# Patient Record
Sex: FEMALE | Race: WHITE | HISPANIC OR LATINO | Employment: FULL TIME | URBAN - METROPOLITAN AREA
[De-identification: names, ages, dates, MRNs, and addresses within clinical notes are randomized per-mention and may not be internally consistent; named-entity substitution may affect disease eponyms.]

---

## 2021-01-20 ENCOUNTER — HOSPITAL ENCOUNTER (OUTPATIENT)
Age: 36
Setting detail: OBSERVATION
Discharge: HOME | End: 2021-01-22
Attending: STUDENT IN AN ORGANIZED HEALTH CARE EDUCATION/TRAINING PROGRAM | Admitting: INTERNAL MEDICINE

## 2021-01-20 ENCOUNTER — APPOINTMENT (OUTPATIENT)
Dept: CT IMAGING | Age: 36
End: 2021-01-20
Attending: INTERNAL MEDICINE

## 2021-01-20 ENCOUNTER — APPOINTMENT (OUTPATIENT)
Dept: GENERAL RADIOLOGY | Age: 36
End: 2021-01-20
Attending: STUDENT IN AN ORGANIZED HEALTH CARE EDUCATION/TRAINING PROGRAM

## 2021-01-20 DIAGNOSIS — K30 INDIGESTION: ICD-10-CM

## 2021-01-20 DIAGNOSIS — U07.1 COVID-19 VIRUS INFECTION: Primary | ICD-10-CM

## 2021-01-20 DIAGNOSIS — K37 APPENDICITIS, UNSPECIFIED APPENDICITIS TYPE: ICD-10-CM

## 2021-01-20 LAB
ALBUMIN SERPL-MCNC: 4 G/DL (ref 3.6–5.1)
ALBUMIN/GLOB SERPL: 1.1 {RATIO} (ref 1–2.4)
ALP SERPL-CCNC: 84 UNITS/L (ref 45–117)
ALT SERPL-CCNC: 108 UNITS/L
ANION GAP SERPL CALC-SCNC: 8 MMOL/L (ref 10–20)
APPEARANCE UR: CLEAR
AST SERPL-CCNC: 88 UNITS/L
ATRIAL RATE (BPM): 86
BACTERIA #/AREA URNS HPF: ABNORMAL /HPF
BASOPHILS # BLD: 0 K/MCL (ref 0–0.3)
BASOPHILS NFR BLD: 0 %
BILIRUB SERPL-MCNC: 0.3 MG/DL (ref 0.2–1)
BILIRUB UR QL STRIP: NEGATIVE
BUN SERPL-MCNC: 7 MG/DL (ref 6–20)
BUN/CREAT SERPL: 10 (ref 7–25)
CALCIUM SERPL-MCNC: 8.5 MG/DL (ref 8.4–10.2)
CHLORIDE SERPL-SCNC: 103 MMOL/L (ref 98–107)
CO2 SERPL-SCNC: 27 MMOL/L (ref 21–32)
COLOR UR: YELLOW
CREAT SERPL-MCNC: 0.67 MG/DL (ref 0.51–0.95)
D DIMER PPP FEU-MCNC: <0.19 MG/L (FEU)
DEPRECATED RDW RBC: 45.3 FL (ref 39–50)
EOSINOPHIL # BLD: 0 K/MCL (ref 0–0.5)
EOSINOPHIL NFR BLD: 0 %
ERYTHROCYTE [DISTWIDTH] IN BLOOD: 13.2 % (ref 11–15)
FASTING DURATION TIME PATIENT: ABNORMAL H
FERRITIN SERPL-MCNC: 193 NG/ML (ref 8–252)
GFR SERPLBLD BASED ON 1.73 SQ M-ARVRAT: >90 ML/MIN/1.73M2
GLOBULIN SER-MCNC: 3.7 G/DL (ref 2–4)
GLUCOSE SERPL-MCNC: 91 MG/DL (ref 65–99)
GLUCOSE UR STRIP-MCNC: NEGATIVE MG/DL
HCG UR QL: NEGATIVE
HCT VFR BLD CALC: 38.1 % (ref 36–46.5)
HGB BLD-MCNC: 12.4 G/DL (ref 12–15.5)
HGB UR QL STRIP: NEGATIVE
HYALINE CASTS #/AREA URNS LPF: ABNORMAL /LPF
IMM GRANULOCYTES # BLD AUTO: 0 K/MCL (ref 0–0.2)
IMM GRANULOCYTES # BLD: 1 %
IRON SATN MFR SERPL: 6 % (ref 15–45)
IRON SERPL-MCNC: 14 MCG/DL (ref 50–170)
KETONES UR STRIP-MCNC: 80 MG/DL
LEUKOCYTE ESTERASE UR QL STRIP: NEGATIVE
LIPASE SERPL-CCNC: <50 UNITS/L (ref 73–393)
LYMPHOCYTES # BLD: 0.8 K/MCL (ref 1–4.8)
LYMPHOCYTES NFR BLD: 10 %
MAGNESIUM SERPL-MCNC: 2.4 MG/DL (ref 1.7–2.4)
MCH RBC QN AUTO: 30 PG (ref 26–34)
MCHC RBC AUTO-ENTMCNC: 32.5 G/DL (ref 32–36.5)
MCV RBC AUTO: 92 FL (ref 78–100)
MONOCYTES # BLD: 0.6 K/MCL (ref 0.3–0.9)
MONOCYTES NFR BLD: 8 %
MUCOUS THREADS URNS QL MICRO: PRESENT
NEUTROPHILS # BLD: 5.9 K/MCL (ref 1.8–7.7)
NEUTROPHILS NFR BLD: 81 %
NITRITE UR QL STRIP: NEGATIVE
NRBC BLD MANUAL-RTO: 0 /100 WBC
P AXIS (DEGREES): 57
PH UR STRIP: 6 [PH] (ref 5–7)
PLATELET # BLD AUTO: 268 K/MCL (ref 140–450)
POTASSIUM SERPL-SCNC: 3.4 MMOL/L (ref 3.4–5.1)
PR-INTERVAL (MSEC): 132
PROT SERPL-MCNC: 7.7 G/DL (ref 6.4–8.2)
PROT UR STRIP-MCNC: NEGATIVE MG/DL
QRS-INTERVAL (MSEC): 86
QT-INTERVAL (MSEC): 388
QTC: 464
R AXIS (DEGREES): 39
RBC # BLD: 4.14 MIL/MCL (ref 4–5.2)
RBC #/AREA URNS HPF: ABNORMAL /HPF
REPORT TEXT: NORMAL
SODIUM SERPL-SCNC: 135 MMOL/L (ref 135–145)
SP GR UR STRIP: 1.01 (ref 1–1.03)
SQUAMOUS #/AREA URNS HPF: ABNORMAL /HPF
T AXIS (DEGREES): 2
TIBC SERPL-MCNC: 254 MCG/DL (ref 250–450)
TROPONIN I SERPL HS-MCNC: <0.02 NG/ML
UROBILINOGEN UR STRIP-MCNC: 0.2 MG/DL
VENTRICULAR RATE EKG/MIN (BPM): 86
WBC # BLD: 7.3 K/MCL (ref 4.2–11)
WBC #/AREA URNS HPF: ABNORMAL /HPF

## 2021-01-20 PROCEDURE — 96372 THER/PROPH/DIAG INJ SC/IM: CPT

## 2021-01-20 PROCEDURE — 83550 IRON BINDING TEST: CPT | Performed by: INTERNAL MEDICINE

## 2021-01-20 PROCEDURE — G0378 HOSPITAL OBSERVATION PER HR: HCPCS

## 2021-01-20 PROCEDURE — 96375 TX/PRO/DX INJ NEW DRUG ADDON: CPT

## 2021-01-20 PROCEDURE — 96361 HYDRATE IV INFUSION ADD-ON: CPT

## 2021-01-20 PROCEDURE — 86704 HEP B CORE ANTIBODY TOTAL: CPT | Performed by: INTERNAL MEDICINE

## 2021-01-20 PROCEDURE — 10002807 HB RX 258: Performed by: STUDENT IN AN ORGANIZED HEALTH CARE EDUCATION/TRAINING PROGRAM

## 2021-01-20 PROCEDURE — 84703 CHORIONIC GONADOTROPIN ASSAY: CPT

## 2021-01-20 PROCEDURE — 96374 THER/PROPH/DIAG INJ IV PUSH: CPT

## 2021-01-20 PROCEDURE — 10002803 HB RX 637: Performed by: INTERNAL MEDICINE

## 2021-01-20 PROCEDURE — 99284 EMERGENCY DEPT VISIT MOD MDM: CPT

## 2021-01-20 PROCEDURE — 36415 COLL VENOUS BLD VENIPUNCTURE: CPT | Performed by: INTERNAL MEDICINE

## 2021-01-20 PROCEDURE — 96376 TX/PRO/DX INJ SAME DRUG ADON: CPT

## 2021-01-20 PROCEDURE — 93005 ELECTROCARDIOGRAM TRACING: CPT | Performed by: STUDENT IN AN ORGANIZED HEALTH CARE EDUCATION/TRAINING PROGRAM

## 2021-01-20 PROCEDURE — 99285 EMERGENCY DEPT VISIT HI MDM: CPT | Performed by: REGISTERED NURSE

## 2021-01-20 PROCEDURE — 10002807 HB RX 258: Performed by: INTERNAL MEDICINE

## 2021-01-20 PROCEDURE — 83690 ASSAY OF LIPASE: CPT | Performed by: STUDENT IN AN ORGANIZED HEALTH CARE EDUCATION/TRAINING PROGRAM

## 2021-01-20 PROCEDURE — 85379 FIBRIN DEGRADATION QUANT: CPT | Performed by: INTERNAL MEDICINE

## 2021-01-20 PROCEDURE — 10002019 HB COUNTER RESP ASSESSMENT

## 2021-01-20 PROCEDURE — 74176 CT ABD & PELVIS W/O CONTRAST: CPT

## 2021-01-20 PROCEDURE — 93010 ELECTROCARDIOGRAM REPORT: CPT | Performed by: INTERNAL MEDICINE

## 2021-01-20 PROCEDURE — 10004281 HB COUNTER-STAFF TIME PER 15 MIN

## 2021-01-20 PROCEDURE — 80053 COMPREHEN METABOLIC PANEL: CPT | Performed by: EMERGENCY MEDICINE

## 2021-01-20 PROCEDURE — 10002800 HB RX 250 W HCPCS: Performed by: INTERNAL MEDICINE

## 2021-01-20 PROCEDURE — 85025 COMPLETE CBC W/AUTO DIFF WBC: CPT | Performed by: EMERGENCY MEDICINE

## 2021-01-20 PROCEDURE — 87340 HEPATITIS B SURFACE AG IA: CPT | Performed by: INTERNAL MEDICINE

## 2021-01-20 PROCEDURE — 81001 URINALYSIS AUTO W/SCOPE: CPT | Performed by: INTERNAL MEDICINE

## 2021-01-20 PROCEDURE — 83735 ASSAY OF MAGNESIUM: CPT | Performed by: STUDENT IN AN ORGANIZED HEALTH CARE EDUCATION/TRAINING PROGRAM

## 2021-01-20 PROCEDURE — 84484 ASSAY OF TROPONIN QUANT: CPT | Performed by: STUDENT IN AN ORGANIZED HEALTH CARE EDUCATION/TRAINING PROGRAM

## 2021-01-20 PROCEDURE — 71045 X-RAY EXAM CHEST 1 VIEW: CPT

## 2021-01-20 PROCEDURE — 10004651 HB RX, NO CHARGE ITEM: Performed by: INTERNAL MEDICINE

## 2021-01-20 PROCEDURE — 10002800 HB RX 250 W HCPCS: Performed by: STUDENT IN AN ORGANIZED HEALTH CARE EDUCATION/TRAINING PROGRAM

## 2021-01-20 PROCEDURE — 82728 ASSAY OF FERRITIN: CPT | Performed by: INTERNAL MEDICINE

## 2021-01-20 RX ORDER — ACETAMINOPHEN 325 MG/1
650 TABLET ORAL EVERY 4 HOURS PRN
Status: DISCONTINUED | OUTPATIENT
Start: 2021-01-20 | End: 2021-01-22 | Stop reason: HOSPADM

## 2021-01-20 RX ORDER — ONDANSETRON 2 MG/ML
4 INJECTION INTRAMUSCULAR; INTRAVENOUS 2 TIMES DAILY PRN
Status: DISCONTINUED | OUTPATIENT
Start: 2021-01-20 | End: 2021-01-21

## 2021-01-20 RX ORDER — TRAMADOL HYDROCHLORIDE 50 MG/1
50 TABLET ORAL EVERY 6 HOURS PRN
Status: DISCONTINUED | OUTPATIENT
Start: 2021-01-20 | End: 2021-01-22 | Stop reason: HOSPADM

## 2021-01-20 RX ORDER — 0.9 % SODIUM CHLORIDE 0.9 %
2 VIAL (ML) INJECTION EVERY 12 HOURS SCHEDULED
Status: DISCONTINUED | OUTPATIENT
Start: 2021-01-20 | End: 2021-01-22 | Stop reason: HOSPADM

## 2021-01-20 RX ORDER — ALBUTEROL SULFATE 90 UG/1
2 AEROSOL, METERED RESPIRATORY (INHALATION)
Status: DISCONTINUED | OUTPATIENT
Start: 2021-01-20 | End: 2021-01-22 | Stop reason: HOSPADM

## 2021-01-20 RX ORDER — ENOXAPARIN SODIUM 100 MG/ML
40 INJECTION SUBCUTANEOUS DAILY
Status: DISCONTINUED | OUTPATIENT
Start: 2021-01-20 | End: 2021-01-22 | Stop reason: HOSPADM

## 2021-01-20 RX ORDER — ONDANSETRON 2 MG/ML
4 INJECTION INTRAMUSCULAR; INTRAVENOUS ONCE
Status: COMPLETED | OUTPATIENT
Start: 2021-01-20 | End: 2021-01-20

## 2021-01-20 RX ORDER — MAGNESIUM HYDROXIDE/ALUMINUM HYDROXICE/SIMETHICONE 120; 1200; 1200 MG/30ML; MG/30ML; MG/30ML
30 SUSPENSION ORAL EVERY 4 HOURS PRN
Status: DISCONTINUED | OUTPATIENT
Start: 2021-01-20 | End: 2021-01-22 | Stop reason: HOSPADM

## 2021-01-20 RX ORDER — SODIUM CHLORIDE, SODIUM LACTATE, POTASSIUM CHLORIDE, CALCIUM CHLORIDE 600; 310; 30; 20 MG/100ML; MG/100ML; MG/100ML; MG/100ML
INJECTION, SOLUTION INTRAVENOUS CONTINUOUS
Status: DISCONTINUED | OUTPATIENT
Start: 2021-01-20 | End: 2021-01-21

## 2021-01-20 RX ORDER — IPRATROPIUM BROMIDE AND ALBUTEROL SULFATE 2.5; .5 MG/3ML; MG/3ML
3 SOLUTION RESPIRATORY (INHALATION)
Status: DISCONTINUED | OUTPATIENT
Start: 2021-01-20 | End: 2021-01-20 | Stop reason: CLARIF

## 2021-01-20 RX ORDER — ONDANSETRON 2 MG/ML
4 INJECTION INTRAMUSCULAR; INTRAVENOUS EVERY 6 HOURS PRN
Status: DISCONTINUED | OUTPATIENT
Start: 2021-01-20 | End: 2021-01-20

## 2021-01-20 RX ORDER — POLYETHYLENE GLYCOL 3350 17 G/17G
17 POWDER, FOR SOLUTION ORAL DAILY PRN
Status: DISCONTINUED | OUTPATIENT
Start: 2021-01-20 | End: 2021-01-22 | Stop reason: HOSPADM

## 2021-01-20 RX ORDER — AMOXICILLIN 250 MG
2 CAPSULE ORAL DAILY PRN
Status: DISCONTINUED | OUTPATIENT
Start: 2021-01-20 | End: 2021-01-22 | Stop reason: HOSPADM

## 2021-01-20 RX ORDER — PROCHLORPERAZINE EDISYLATE 5 MG/ML
10 INJECTION INTRAMUSCULAR; INTRAVENOUS EVERY 6 HOURS PRN
Status: DISCONTINUED | OUTPATIENT
Start: 2021-01-20 | End: 2021-01-22 | Stop reason: HOSPADM

## 2021-01-20 RX ORDER — METOCLOPRAMIDE HYDROCHLORIDE 5 MG/ML
5 INJECTION INTRAMUSCULAR; INTRAVENOUS EVERY 6 HOURS PRN
Status: DISCONTINUED | OUTPATIENT
Start: 2021-01-20 | End: 2021-01-22

## 2021-01-20 RX ORDER — POTASSIUM CHLORIDE 1.5 G/1.58G
40 POWDER, FOR SOLUTION ORAL ONCE
Status: DISCONTINUED | OUTPATIENT
Start: 2021-01-20 | End: 2021-01-22 | Stop reason: HOSPADM

## 2021-01-20 RX ORDER — METOCLOPRAMIDE HYDROCHLORIDE 5 MG/ML
10 INJECTION INTRAMUSCULAR; INTRAVENOUS ONCE
Status: COMPLETED | OUTPATIENT
Start: 2021-01-20 | End: 2021-01-20

## 2021-01-20 RX ORDER — ALBUTEROL SULFATE 90 UG/1
2 AEROSOL, METERED RESPIRATORY (INHALATION)
Status: DISCONTINUED | OUTPATIENT
Start: 2021-01-20 | End: 2021-01-20

## 2021-01-20 RX ORDER — BISACODYL 10 MG
10 SUPPOSITORY, RECTAL RECTAL DAILY PRN
Status: DISCONTINUED | OUTPATIENT
Start: 2021-01-20 | End: 2021-01-22 | Stop reason: HOSPADM

## 2021-01-20 RX ORDER — ACETAMINOPHEN 500 MG
1000 TABLET ORAL EVERY 6 HOURS PRN
COMMUNITY

## 2021-01-20 RX ADMIN — METOCLOPRAMIDE HYDROCHLORIDE 10 MG: 5 INJECTION INTRAMUSCULAR; INTRAVENOUS at 14:07

## 2021-01-20 RX ADMIN — ONDANSETRON 4 MG: 2 INJECTION INTRAMUSCULAR; INTRAVENOUS at 12:23

## 2021-01-20 RX ADMIN — ENOXAPARIN SODIUM 40 MG: 40 INJECTION SUBCUTANEOUS at 21:12

## 2021-01-20 RX ADMIN — SODIUM CHLORIDE, PRESERVATIVE FREE 2 ML: 5 INJECTION INTRAVENOUS at 21:12

## 2021-01-20 RX ADMIN — SODIUM CHLORIDE, SODIUM LACTATE, POTASSIUM CHLORIDE, AND CALCIUM CHLORIDE 1000 ML: .6; .31; .03; .02 INJECTION, SOLUTION INTRAVENOUS at 12:23

## 2021-01-20 RX ADMIN — ALBUTEROL SULFATE 2 PUFF: 90 AEROSOL, METERED RESPIRATORY (INHALATION) at 23:00

## 2021-01-20 RX ADMIN — SODIUM CHLORIDE, SODIUM LACTATE, POTASSIUM CHLORIDE, AND CALCIUM CHLORIDE 1000 ML: .6; .31; .03; .02 INJECTION, SOLUTION INTRAVENOUS at 14:08

## 2021-01-20 RX ADMIN — SODIUM CHLORIDE, SODIUM LACTATE, POTASSIUM CHLORIDE, AND CALCIUM CHLORIDE: .6; .31; .03; .02 INJECTION, SOLUTION INTRAVENOUS at 23:04

## 2021-01-20 RX ADMIN — METOCLOPRAMIDE HYDROCHLORIDE 5 MG: 5 INJECTION INTRAMUSCULAR; INTRAVENOUS at 17:41

## 2021-01-20 ASSESSMENT — ENCOUNTER SYMPTOMS
HEADACHES: 0
NAUSEA: 1
BACK PAIN: 0
CHILLS: 0
DIARRHEA: 1
EYE PAIN: 0
CONFUSION: 0
NERVOUS/ANXIOUS: 0
POLYDIPSIA: 0
FATIGUE: 0
VOMITING: 1
VOICE CHANGE: 0
BRUISES/BLEEDS EASILY: 0
COUGH: 0
SORE THROAT: 0
SHORTNESS OF BREATH: 0
ABDOMINAL PAIN: 0
FEVER: 0

## 2021-01-20 ASSESSMENT — COLUMBIA-SUICIDE SEVERITY RATING SCALE - C-SSRS
6. HAVE YOU EVER DONE ANYTHING, STARTED TO DO ANYTHING, OR PREPARED TO DO ANYTHING TO END YOUR LIFE?: NO
1. WITHIN THE PAST MONTH, HAVE YOU WISHED YOU WERE DEAD OR WISHED YOU COULD GO TO SLEEP AND NOT WAKE UP?: NO
IS THE PATIENT ABLE TO COMPLETE C-SSRS: YES
2. HAVE YOU ACTUALLY HAD ANY THOUGHTS OF KILLING YOURSELF?: NO

## 2021-01-20 ASSESSMENT — PATIENT HEALTH QUESTIONNAIRE - PHQ9
SUM OF ALL RESPONSES TO PHQ9 QUESTIONS 1 AND 2: 1
CLINICAL INTERPRETATION OF PHQ9 SCORE: NO FURTHER SCREENING NEEDED
CLINICAL INTERPRETATION OF PHQ2 SCORE: NO FURTHER SCREENING NEEDED
IS PATIENT ABLE TO COMPLETE PHQ2 OR PHQ9: YES
1. LITTLE INTEREST OR PLEASURE IN DOING THINGS: NOT AT ALL
SUM OF ALL RESPONSES TO PHQ9 QUESTIONS 1 AND 2: 1
2. FEELING DOWN, DEPRESSED OR HOPELESS: SEVERAL DAYS

## 2021-01-20 ASSESSMENT — LIFESTYLE VARIABLES
HOW MANY STANDARD DRINKS CONTAINING ALCOHOL DO YOU HAVE ON A TYPICAL DAY: 0,1 OR 2
AUDIT-C TOTAL SCORE: 2
HOW OFTEN DO YOU HAVE 6 OR MORE DRINKS ON ONE OCCASION: LESS THAN MONTHLY
HOW OFTEN DO YOU HAVE A DRINK CONTAINING ALCOHOL: MONTHLY OR LESS
ALCOHOL_USE_STATUS: NO OR LOW RISK WITH VALIDATED TOOL

## 2021-01-20 ASSESSMENT — PAIN SCALES - GENERAL
PAINLEVEL_OUTOF10: 5
PAINLEVEL_OUTOF10: 0
PAINLEVEL_OUTOF10: 2

## 2021-01-20 ASSESSMENT — ACTIVITIES OF DAILY LIVING (ADL)
CHRONIC_PAIN_PRESENT: YES, CHRONIC
ADL_SCORE: 12
ADL_BEFORE_ADMISSION: INDEPENDENT
ADL_SHORT_OF_BREATH: YES

## 2021-01-20 ASSESSMENT — COGNITIVE AND FUNCTIONAL STATUS - GENERAL
ARE YOU BLIND OR DO YOU HAVE SERIOUS DIFFICULTY SEEING, EVEN WHEN WEARING GLASSES: NO
ARE YOU DEAF OR DO YOU HAVE SERIOUS DIFFICULTY  HEARING: NO

## 2021-01-21 LAB
ALBUMIN SERPL-MCNC: 3.3 G/DL (ref 3.6–5.1)
ALBUMIN/GLOB SERPL: 1 {RATIO} (ref 1–2.4)
ALP SERPL-CCNC: 76 UNITS/L (ref 45–117)
ALT SERPL-CCNC: 101 UNITS/L
ANION GAP SERPL CALC-SCNC: 13 MMOL/L (ref 10–20)
ANNOTATION COMMENT IMP: NORMAL
AST SERPL-CCNC: 95 UNITS/L
BASOPHILS # BLD: 0 K/MCL (ref 0–0.3)
BASOPHILS NFR BLD: 0 %
BILIRUB SERPL-MCNC: 0.2 MG/DL (ref 0.2–1)
BUN SERPL-MCNC: 3 MG/DL (ref 6–20)
BUN/CREAT SERPL: 5 (ref 7–25)
CALCIUM SERPL-MCNC: 8.1 MG/DL (ref 8.4–10.2)
CHLORIDE SERPL-SCNC: 107 MMOL/L (ref 98–107)
CO2 SERPL-SCNC: 23 MMOL/L (ref 21–32)
CREAT SERPL-MCNC: 0.55 MG/DL (ref 0.51–0.95)
D DIMER PPP FEU-MCNC: <0.19 MG/L (FEU)
DEPRECATED RDW RBC: 46.9 FL (ref 39–50)
EOSINOPHIL # BLD: 0 K/MCL (ref 0–0.5)
EOSINOPHIL NFR BLD: 0 %
ERYTHROCYTE [DISTWIDTH] IN BLOOD: 13.4 % (ref 11–15)
FASTING DURATION TIME PATIENT: ABNORMAL H
GFR SERPLBLD BASED ON 1.73 SQ M-ARVRAT: >90 ML/MIN/1.73M2
GLOBULIN SER-MCNC: 3.3 G/DL (ref 2–4)
GLUCOSE SERPL-MCNC: 73 MG/DL (ref 65–99)
HBV CORE IGG+IGM SER QL: NEGATIVE
HBV SURFACE AB SER QL: NEGATIVE
HBV SURFACE AG SER QL: NEGATIVE
HCT VFR BLD CALC: 35.4 % (ref 36–46.5)
HCV AB SER QL: NEGATIVE
HGB BLD-MCNC: 11.2 G/DL (ref 12–15.5)
IMM GRANULOCYTES # BLD AUTO: 0.1 K/MCL (ref 0–0.2)
IMM GRANULOCYTES # BLD: 1 %
INR PPP: 1.1
LYMPHOCYTES # BLD: 1.1 K/MCL (ref 1–4.8)
LYMPHOCYTES NFR BLD: 15 %
MAGNESIUM SERPL-MCNC: 2 MG/DL (ref 1.7–2.4)
MCH RBC QN AUTO: 30.1 PG (ref 26–34)
MCHC RBC AUTO-ENTMCNC: 31.6 G/DL (ref 32–36.5)
MCV RBC AUTO: 95.2 FL (ref 78–100)
MONOCYTES # BLD: 0.5 K/MCL (ref 0.3–0.9)
MONOCYTES NFR BLD: 7 %
NEUTROPHILS # BLD: 5.4 K/MCL (ref 1.8–7.7)
NEUTROPHILS NFR BLD: 77 %
NRBC BLD MANUAL-RTO: 0 /100 WBC
NT-PROBNP SERPL-MCNC: 145 PG/ML
PLATELET # BLD AUTO: 253 K/MCL (ref 140–450)
POTASSIUM SERPL-SCNC: 3.4 MMOL/L (ref 3.4–5.1)
PROCALCITONIN SERPL IA-MCNC: 0.15 NG/ML
PROT SERPL-MCNC: 6.6 G/DL (ref 6.4–8.2)
PROTHROMBIN TIME: 11.6 SEC (ref 9.7–11.8)
RBC # BLD: 3.72 MIL/MCL (ref 4–5.2)
SODIUM SERPL-SCNC: 140 MMOL/L (ref 135–145)
WBC # BLD: 7.1 K/MCL (ref 4.2–11)

## 2021-01-21 PROCEDURE — 36415 COLL VENOUS BLD VENIPUNCTURE: CPT | Performed by: INTERNAL MEDICINE

## 2021-01-21 PROCEDURE — 96372 THER/PROPH/DIAG INJ SC/IM: CPT

## 2021-01-21 PROCEDURE — 85610 PROTHROMBIN TIME: CPT | Performed by: INTERNAL MEDICINE

## 2021-01-21 PROCEDURE — 10002800 HB RX 250 W HCPCS: Performed by: INTERNAL MEDICINE

## 2021-01-21 PROCEDURE — 84145 PROCALCITONIN (PCT): CPT | Performed by: INTERNAL MEDICINE

## 2021-01-21 PROCEDURE — 10002800 HB RX 250 W HCPCS: Performed by: HOSPITALIST

## 2021-01-21 PROCEDURE — 10002807 HB RX 258: Performed by: INTERNAL MEDICINE

## 2021-01-21 PROCEDURE — 99220 INITIAL OBSERVATION CARE,LEVL III: CPT | Performed by: SURGERY

## 2021-01-21 PROCEDURE — 85379 FIBRIN DEGRADATION QUANT: CPT | Performed by: INTERNAL MEDICINE

## 2021-01-21 PROCEDURE — 85025 COMPLETE CBC W/AUTO DIFF WBC: CPT | Performed by: INTERNAL MEDICINE

## 2021-01-21 PROCEDURE — 10004651 HB RX, NO CHARGE ITEM: Performed by: INTERNAL MEDICINE

## 2021-01-21 PROCEDURE — G0378 HOSPITAL OBSERVATION PER HR: HCPCS

## 2021-01-21 PROCEDURE — C9399 UNCLASSIFIED DRUGS OR BIOLOG: HCPCS | Performed by: INTERNAL MEDICINE

## 2021-01-21 PROCEDURE — 96376 TX/PRO/DX INJ SAME DRUG ADON: CPT

## 2021-01-21 PROCEDURE — 96361 HYDRATE IV INFUSION ADD-ON: CPT

## 2021-01-21 PROCEDURE — 83880 ASSAY OF NATRIURETIC PEPTIDE: CPT | Performed by: INTERNAL MEDICINE

## 2021-01-21 PROCEDURE — 83735 ASSAY OF MAGNESIUM: CPT | Performed by: INTERNAL MEDICINE

## 2021-01-21 PROCEDURE — 80053 COMPREHEN METABOLIC PANEL: CPT | Performed by: INTERNAL MEDICINE

## 2021-01-21 PROCEDURE — 10002807 HB RX 258: Performed by: HOSPITALIST

## 2021-01-21 PROCEDURE — 96375 TX/PRO/DX INJ NEW DRUG ADDON: CPT

## 2021-01-21 RX ORDER — SODIUM CHLORIDE 9 MG/ML
INJECTION, SOLUTION INTRAVENOUS CONTINUOUS
Status: DISCONTINUED | OUTPATIENT
Start: 2021-01-21 | End: 2021-01-22 | Stop reason: CLARIF

## 2021-01-21 RX ORDER — FAMOTIDINE 10 MG/ML
20 INJECTION, SOLUTION INTRAVENOUS ONCE
Status: COMPLETED | OUTPATIENT
Start: 2021-01-21 | End: 2021-01-22

## 2021-01-21 RX ADMIN — REMDESIVIR 200 MG: 100 INJECTION, POWDER, LYOPHILIZED, FOR SOLUTION INTRAVENOUS at 03:50

## 2021-01-21 RX ADMIN — METOCLOPRAMIDE HYDROCHLORIDE 5 MG: 5 INJECTION INTRAMUSCULAR; INTRAVENOUS at 23:45

## 2021-01-21 RX ADMIN — ALBUTEROL SULFATE 2 PUFF: 90 AEROSOL, METERED RESPIRATORY (INHALATION) at 11:45

## 2021-01-21 RX ADMIN — SODIUM CHLORIDE: 0.9 INJECTION, SOLUTION INTRAVENOUS at 03:48

## 2021-01-21 RX ADMIN — METOCLOPRAMIDE HYDROCHLORIDE 5 MG: 5 INJECTION INTRAMUSCULAR; INTRAVENOUS at 09:06

## 2021-01-21 RX ADMIN — PROCHLORPERAZINE EDISYLATE 10 MG: 5 INJECTION INTRAMUSCULAR; INTRAVENOUS at 21:02

## 2021-01-21 RX ADMIN — SODIUM CHLORIDE: 0.9 INJECTION, SOLUTION INTRAVENOUS at 15:30

## 2021-01-21 RX ADMIN — PIPERACILLIN AND TAZOBACTAM 3.38 G: 3; .375 INJECTION, POWDER, LYOPHILIZED, FOR SOLUTION INTRAVENOUS; PARENTERAL at 04:58

## 2021-01-21 RX ADMIN — METOCLOPRAMIDE HYDROCHLORIDE 5 MG: 5 INJECTION INTRAMUSCULAR; INTRAVENOUS at 17:54

## 2021-01-21 RX ADMIN — SODIUM CHLORIDE, PRESERVATIVE FREE 2 ML: 5 INJECTION INTRAVENOUS at 20:31

## 2021-01-21 RX ADMIN — PROCHLORPERAZINE EDISYLATE 10 MG: 5 INJECTION INTRAMUSCULAR; INTRAVENOUS at 03:45

## 2021-01-21 RX ADMIN — ONDANSETRON 4 MG: 2 INJECTION INTRAMUSCULAR; INTRAVENOUS at 12:20

## 2021-01-21 RX ADMIN — SODIUM CHLORIDE, PRESERVATIVE FREE 2 ML: 5 INJECTION INTRAVENOUS at 09:02

## 2021-01-21 RX ADMIN — KETOROLAC TROMETHAMINE 15 MG: 15 INJECTION, SOLUTION INTRAMUSCULAR; INTRAVENOUS at 00:35

## 2021-01-21 RX ADMIN — KETOROLAC TROMETHAMINE 15 MG: 15 INJECTION, SOLUTION INTRAMUSCULAR; INTRAVENOUS at 18:38

## 2021-01-21 RX ADMIN — ENOXAPARIN SODIUM 40 MG: 40 INJECTION SUBCUTANEOUS at 09:02

## 2021-01-21 RX ADMIN — KETOROLAC TROMETHAMINE 15 MG: 15 INJECTION, SOLUTION INTRAMUSCULAR; INTRAVENOUS at 12:32

## 2021-01-21 RX ADMIN — PROCHLORPERAZINE EDISYLATE 10 MG: 5 INJECTION INTRAMUSCULAR; INTRAVENOUS at 15:18

## 2021-01-21 RX ADMIN — METOCLOPRAMIDE HYDROCHLORIDE 5 MG: 5 INJECTION INTRAMUSCULAR; INTRAVENOUS at 00:33

## 2021-01-21 ASSESSMENT — PAIN SCALES - GENERAL
PAINLEVEL_OUTOF10: 4
PAINLEVEL_OUTOF10: 5
PAINLEVEL_OUTOF10: 7
PAINLEVEL_OUTOF10: 0
PAINLEVEL_OUTOF10: 8
PAINLEVEL_OUTOF10: 7
PAINLEVEL_OUTOF10: 2

## 2021-01-21 ASSESSMENT — COGNITIVE AND FUNCTIONAL STATUS - GENERAL
ARE YOU DEAF OR DO YOU HAVE SERIOUS DIFFICULTY  HEARING: NO
DO YOU HAVE DIFFICULTY DRESSING OR BATHING: NO
BECAUSE OF A PHYSICAL, MENTAL, OR EMOTIONAL CONDITION, DO YOU HAVE DIFFICULTY DOING ERRANDS ALONE: NO
BECAUSE OF A PHYSICAL, MENTAL, OR EMOTIONAL CONDITION, DO YOU HAVE SERIOUS DIFFICULTY CONCENTRATING, REMEMBERING OR MAKING DECISIONS: NO
DO YOU HAVE SERIOUS DIFFICULTY WALKING OR CLIMBING STAIRS: NO
ARE YOU BLIND OR DO YOU HAVE SERIOUS DIFFICULTY SEEING, EVEN WHEN WEARING GLASSES: NO

## 2021-01-21 ASSESSMENT — ACTIVITIES OF DAILY LIVING (ADL)
ADL_SHORT_OF_BREATH: YES
ADL_BEFORE_ADMISSION: INDEPENDENT
CHRONIC_PAIN_PRESENT: NO
ADL_SCORE: 12
RECENT_DECLINE_ADL: NO
DESCRIBE HOW PAIN IMPACTS YOUR LIFE: NONE/CAN MANAGE

## 2021-01-21 ASSESSMENT — LIFESTYLE VARIABLES
HOW OFTEN DO YOU HAVE A DRINK CONTAINING ALCOHOL: NEVER
AUDIT-C TOTAL SCORE: 0
HOW OFTEN DO YOU HAVE 6 OR MORE DRINKS ON ONE OCCASION: NEVER
HOW MANY STANDARD DRINKS CONTAINING ALCOHOL DO YOU HAVE ON A TYPICAL DAY: 0,1 OR 2
ALCOHOL_USE_STATUS: NO OR LOW RISK WITH VALIDATED TOOL

## 2021-01-22 VITALS
SYSTOLIC BLOOD PRESSURE: 98 MMHG | HEIGHT: 63 IN | WEIGHT: 125 LBS | OXYGEN SATURATION: 98 % | TEMPERATURE: 98.2 F | RESPIRATION RATE: 14 BRPM | HEART RATE: 95 BPM | BODY MASS INDEX: 22.15 KG/M2 | DIASTOLIC BLOOD PRESSURE: 72 MMHG

## 2021-01-22 LAB
ALBUMIN SERPL-MCNC: 2.9 G/DL (ref 3.6–5.1)
ALBUMIN/GLOB SERPL: 0.9 {RATIO} (ref 1–2.4)
ALP SERPL-CCNC: 69 UNITS/L (ref 45–117)
ALT SERPL-CCNC: 89 UNITS/L
ANION GAP SERPL CALC-SCNC: 16 MMOL/L (ref 10–20)
AST SERPL-CCNC: 74 UNITS/L
BASOPHILS # BLD: 0 K/MCL (ref 0–0.3)
BASOPHILS NFR BLD: 0 %
BILIRUB SERPL-MCNC: 0.3 MG/DL (ref 0.2–1)
BUN SERPL-MCNC: 4 MG/DL (ref 6–20)
BUN/CREAT SERPL: 9 (ref 7–25)
CALCIUM SERPL-MCNC: 8.1 MG/DL (ref 8.4–10.2)
CHLORIDE SERPL-SCNC: 110 MMOL/L (ref 98–107)
CO2 SERPL-SCNC: 16 MMOL/L (ref 21–32)
CREAT SERPL-MCNC: 0.44 MG/DL (ref 0.51–0.95)
D DIMER PPP FEU-MCNC: 0.21 MG/L (FEU)
DEPRECATED RDW RBC: 47.1 FL (ref 39–50)
EOSINOPHIL # BLD: 0 K/MCL (ref 0–0.5)
EOSINOPHIL NFR BLD: 0 %
ERYTHROCYTE [DISTWIDTH] IN BLOOD: 13.4 % (ref 11–15)
FASTING DURATION TIME PATIENT: ABNORMAL H
GFR SERPLBLD BASED ON 1.73 SQ M-ARVRAT: >90 ML/MIN/1.73M2
GLOBULIN SER-MCNC: 3.4 G/DL (ref 2–4)
GLUCOSE SERPL-MCNC: 80 MG/DL (ref 65–99)
HCT VFR BLD CALC: 32 % (ref 36–46.5)
HGB BLD-MCNC: 10.3 G/DL (ref 12–15.5)
IMM GRANULOCYTES # BLD AUTO: 0.1 K/MCL (ref 0–0.2)
IMM GRANULOCYTES # BLD: 1 %
LYMPHOCYTES # BLD: 1.2 K/MCL (ref 1–4.8)
LYMPHOCYTES NFR BLD: 21 %
MCH RBC QN AUTO: 30.7 PG (ref 26–34)
MCHC RBC AUTO-ENTMCNC: 32.2 G/DL (ref 32–36.5)
MCV RBC AUTO: 95.2 FL (ref 78–100)
MONOCYTES # BLD: 0.5 K/MCL (ref 0.3–0.9)
MONOCYTES NFR BLD: 8 %
NEUTROPHILS # BLD: 4 K/MCL (ref 1.8–7.7)
NEUTROPHILS NFR BLD: 70 %
NRBC BLD MANUAL-RTO: 0 /100 WBC
PLATELET # BLD AUTO: 217 K/MCL (ref 140–450)
POTASSIUM SERPL-SCNC: 3.5 MMOL/L (ref 3.4–5.1)
PROT SERPL-MCNC: 6.3 G/DL (ref 6.4–8.2)
RBC # BLD: 3.36 MIL/MCL (ref 4–5.2)
SODIUM SERPL-SCNC: 138 MMOL/L (ref 135–145)
WBC # BLD: 5.7 K/MCL (ref 4.2–11)

## 2021-01-22 PROCEDURE — 10002800 HB RX 250 W HCPCS: Performed by: INTERNAL MEDICINE

## 2021-01-22 PROCEDURE — 80053 COMPREHEN METABOLIC PANEL: CPT | Performed by: INTERNAL MEDICINE

## 2021-01-22 PROCEDURE — 36415 COLL VENOUS BLD VENIPUNCTURE: CPT | Performed by: INTERNAL MEDICINE

## 2021-01-22 PROCEDURE — 85379 FIBRIN DEGRADATION QUANT: CPT | Performed by: INTERNAL MEDICINE

## 2021-01-22 PROCEDURE — 96375 TX/PRO/DX INJ NEW DRUG ADDON: CPT

## 2021-01-22 PROCEDURE — G0378 HOSPITAL OBSERVATION PER HR: HCPCS

## 2021-01-22 PROCEDURE — 10004651 HB RX, NO CHARGE ITEM: Performed by: INTERNAL MEDICINE

## 2021-01-22 PROCEDURE — 96372 THER/PROPH/DIAG INJ SC/IM: CPT

## 2021-01-22 PROCEDURE — 96376 TX/PRO/DX INJ SAME DRUG ADON: CPT

## 2021-01-22 PROCEDURE — 10002801 HB RX 250 W/O HCPCS: Performed by: INTERNAL MEDICINE

## 2021-01-22 PROCEDURE — 85025 COMPLETE CBC W/AUTO DIFF WBC: CPT | Performed by: INTERNAL MEDICINE

## 2021-01-22 PROCEDURE — 10002800 HB RX 250 W HCPCS: Performed by: HOSPITALIST

## 2021-01-22 RX ORDER — PANTOPRAZOLE SODIUM 40 MG/1
40 TABLET, DELAYED RELEASE ORAL DAILY
Status: SHIPPED | OUTPATIENT
Start: 2021-01-22

## 2021-01-22 RX ORDER — ALBUTEROL SULFATE 90 UG/1
2 AEROSOL, METERED RESPIRATORY (INHALATION)
Qty: 1 INHALER | Refills: 12 | Status: SHIPPED | OUTPATIENT
Start: 2021-01-22

## 2021-01-22 RX ORDER — METOCLOPRAMIDE 5 MG/1
5 TABLET ORAL 4 TIMES DAILY
Qty: 15 TABLET | Refills: 0 | Status: SHIPPED | OUTPATIENT
Start: 2021-01-22

## 2021-01-22 RX ORDER — ONDANSETRON 2 MG/ML
4 INJECTION INTRAMUSCULAR; INTRAVENOUS ONCE
Status: DISCONTINUED | OUTPATIENT
Start: 2021-01-22 | End: 2021-01-22 | Stop reason: HOSPADM

## 2021-01-22 RX ORDER — MAGNESIUM HYDROXIDE/ALUMINUM HYDROXICE/SIMETHICONE 120; 1200; 1200 MG/30ML; MG/30ML; MG/30ML
30 SUSPENSION ORAL EVERY 4 HOURS PRN
Qty: 100 ML | Refills: 0 | Status: SHIPPED | OUTPATIENT
Start: 2021-01-22

## 2021-01-22 RX ORDER — METOCLOPRAMIDE HYDROCHLORIDE 5 MG/ML
10 INJECTION INTRAMUSCULAR; INTRAVENOUS EVERY 6 HOURS PRN
Status: DISCONTINUED | OUTPATIENT
Start: 2021-01-22 | End: 2021-01-22 | Stop reason: HOSPADM

## 2021-01-22 RX ADMIN — ENOXAPARIN SODIUM 40 MG: 40 INJECTION SUBCUTANEOUS at 09:48

## 2021-01-22 RX ADMIN — FAMOTIDINE 20 MG: 10 INJECTION INTRAVENOUS at 00:16

## 2021-01-22 RX ADMIN — METOCLOPRAMIDE HYDROCHLORIDE 10 MG: 5 INJECTION INTRAMUSCULAR; INTRAVENOUS at 11:22

## 2021-01-22 RX ADMIN — PROCHLORPERAZINE EDISYLATE 10 MG: 5 INJECTION INTRAMUSCULAR; INTRAVENOUS at 09:48

## 2021-01-22 RX ADMIN — PROCHLORPERAZINE EDISYLATE 10 MG: 5 INJECTION INTRAMUSCULAR; INTRAVENOUS at 02:45

## 2021-01-22 RX ADMIN — SODIUM CHLORIDE, PRESERVATIVE FREE 2 ML: 5 INJECTION INTRAVENOUS at 09:30

## 2021-01-22 RX ADMIN — KETOROLAC TROMETHAMINE 15 MG: 15 INJECTION, SOLUTION INTRAMUSCULAR; INTRAVENOUS at 06:36

## 2021-01-22 ASSESSMENT — PAIN SCALES - GENERAL
PAINLEVEL_OUTOF10: 0
PAINLEVEL_OUTOF10: 2
PAINLEVEL_OUTOF10: 5

## 2023-03-13 ENCOUNTER — HOSPITAL ENCOUNTER (OUTPATIENT)
Dept: ULTRASOUND IMAGING | Age: 38
Discharge: HOME | End: 2023-03-13
Attending: STUDENT IN AN ORGANIZED HEALTH CARE EDUCATION/TRAINING PROGRAM
Payer: COMMERCIAL

## 2023-03-13 ENCOUNTER — HOSPITAL ENCOUNTER (OUTPATIENT)
Dept: MAMMOGRAPHY | Age: 38
Discharge: HOME | End: 2023-03-13
Attending: STUDENT IN AN ORGANIZED HEALTH CARE EDUCATION/TRAINING PROGRAM
Payer: COMMERCIAL

## 2023-03-13 DIAGNOSIS — N63.20 MASS OF LEFT BREAST, UNSPECIFIED QUADRANT: ICD-10-CM

## 2023-03-13 PROCEDURE — 76641 ULTRASOUND BREAST COMPLETE: CPT | Performed by: STUDENT IN AN ORGANIZED HEALTH CARE EDUCATION/TRAINING PROGRAM

## 2023-03-13 PROCEDURE — 77062 BREAST TOMOSYNTHESIS BI: CPT | Performed by: STUDENT IN AN ORGANIZED HEALTH CARE EDUCATION/TRAINING PROGRAM

## 2023-03-13 PROCEDURE — 77066 DX MAMMO INCL CAD BI: CPT | Performed by: STUDENT IN AN ORGANIZED HEALTH CARE EDUCATION/TRAINING PROGRAM

## 2023-10-19 ENCOUNTER — HOSPITAL ENCOUNTER (OUTPATIENT)
Age: 38
Discharge: HOME | End: 2023-10-19
Attending: EMERGENCY MEDICINE
Payer: COMMERCIAL

## 2023-10-19 VITALS
OXYGEN SATURATION: 100 % | RESPIRATION RATE: 20 BRPM | TEMPERATURE: 99 F | WEIGHT: 111 LBS | SYSTOLIC BLOOD PRESSURE: 103 MMHG | DIASTOLIC BLOOD PRESSURE: 68 MMHG | HEART RATE: 106 BPM

## 2023-10-19 DIAGNOSIS — U07.1 COVID-19: Primary | ICD-10-CM

## 2023-10-19 PROCEDURE — 93005 ELECTROCARDIOGRAM TRACING: CPT

## 2023-10-19 RX ORDER — NIRMATRELVIR AND RITONAVIR 300-100 MG
KIT ORAL
Qty: 30 TABLET | Refills: 0 | Status: SHIPPED | OUTPATIENT
Start: 2023-10-19 | End: 2023-10-24

## 2023-10-19 RX ORDER — TOFACITINIB 5 MG/1
TABLET, FILM COATED ORAL
COMMUNITY
Start: 2017-12-17

## 2023-10-19 RX ORDER — GABAPENTIN 300 MG/1
1 CAPSULE ORAL 3 TIMES DAILY
COMMUNITY
Start: 2023-09-12

## 2023-10-19 RX ORDER — TRAMADOL HYDROCHLORIDE 50 MG/1
1 TABLET ORAL EVERY 6 HOURS PRN
COMMUNITY
Start: 2023-09-12

## 2023-10-19 RX ORDER — BENZONATATE 100 MG/1
100 CAPSULE ORAL 3 TIMES DAILY PRN
Qty: 30 CAPSULE | Refills: 0 | Status: SHIPPED | OUTPATIENT
Start: 2023-10-19 | End: 2023-11-18

## 2023-10-19 NOTE — ED INITIAL ASSESSMENT (HPI)
Patient c/o chest heaviness and body aches. Patient tested positive for covid today. Symptoms started yesterday.

## 2023-10-19 NOTE — DISCHARGE INSTRUCTIONS
Acetaminophen 1000 mg every 6 hours as needed for pain. Go to the emergency department for any new or worsening symptoms.

## 2023-10-20 LAB
ATRIAL RATE: 90 BPM
P AXIS: 63 DEGREES
P-R INTERVAL: 142 MS
Q-T INTERVAL: 330 MS
QRS DURATION: 80 MS
QTC CALCULATION (BEZET): 403 MS
R AXIS: 53 DEGREES
T AXIS: 27 DEGREES
VENTRICULAR RATE: 90 BPM

## 2024-02-05 ENCOUNTER — OFFICE VISIT (OUTPATIENT)
Dept: OBGYN CLINIC | Facility: CLINIC | Age: 39
End: 2024-02-05
Payer: COMMERCIAL

## 2024-02-05 VITALS — HEART RATE: 82 BPM | SYSTOLIC BLOOD PRESSURE: 94 MMHG | WEIGHT: 118 LBS | DIASTOLIC BLOOD PRESSURE: 62 MMHG

## 2024-02-05 DIAGNOSIS — N94.3 PMS (PREMENSTRUAL SYNDROME): Primary | ICD-10-CM

## 2024-02-05 DIAGNOSIS — N94.6 DYSMENORRHEA: ICD-10-CM

## 2024-02-05 RX ORDER — ACETAMINOPHEN 500 MG
1000 TABLET ORAL EVERY 6 HOURS PRN
COMMUNITY

## 2024-02-05 RX ORDER — ACETAMINOPHEN AND CODEINE PHOSPHATE 120; 12 MG/5ML; MG/5ML
0.35 SOLUTION ORAL DAILY
Qty: 84 TABLET | Refills: 0 | Status: SHIPPED | OUTPATIENT
Start: 2024-02-05 | End: 2025-02-04

## 2024-02-05 RX ORDER — ONDANSETRON 4 MG/1
1 TABLET, ORALLY DISINTEGRATING ORAL EVERY 8 HOURS PRN
COMMUNITY
Start: 2023-10-24

## 2024-02-05 NOTE — PROGRESS NOTES
Here for new gynecology visit.  38 year old G 2 P 2.  Patient's last menstrual period was 2024 (exact date)..     Here to discuss menstrual concerns. She has had ulcerative colitis for years. Her symptoms are not well controlled because they seem to flare with her menses. It starts 2 days prior to her menses and lasts into a few days of the cycle.    She also has increased acne and more recently notes increased cramps with her cycles. It seems to be on day 2 of her menses, the right side most predominantly. It can wrap around to her back as well.    Menses Q 28-30 days for 5-6 days.  Withdrawal for contraception.    Last pap smear was in 2018 and it was normal.  She has had a hx of abnormal pap smears.     OB Hx:  2 .    Family gyn hx:  neg.   Family breast hx:  neg.  Last mammogram in 3/2023.  Screening labs with PCP and Rheumatology  Colonoscopy with GI 2023    Past Medical History:   Diagnosis Date    Fibromyalgia     Ulcerative colitis, chronic (HCC)        Past Surgical History:   Procedure Laterality Date    CHOLECYSTECTOMY       Current Outpatient Medications on File Prior to Visit   Medication Sig Dispense Refill    Multiple Vitamins-Minerals (MULTIVIT/MULTIMINERAL ADULT OR) Take by mouth As Directed.      acetaminophen 500 MG Oral Tab Take 2 tablets (1,000 mg total) by mouth every 6 (six) hours as needed.      ondansetron 4 MG Oral Tablet Dispersible Take 1 tablet (4 mg total) by mouth every 8 (eight) hours as needed.      XELJANZ 5 MG Oral Tab TAKE 1 TABLET TWICE DAILY. MAY BE TAKEN WITH OR WITHOUT FOOD. STORE AT ROOM TEMPERATURE      traMADol 50 MG Oral Tab Take 1 tablet (50 mg total) by mouth every 6 (six) hours as needed.      gabapentin 300 MG Oral Cap Take 1 capsule (300 mg total) by mouth 3 (three) times daily.       No current facility-administered medications on file prior to visit.     OB History    Para Term  AB Living   2 2 2     2   SAB IAB Ectopic Multiple Live  Births           2      # Outcome Date GA Lbr Bridger/2nd Weight Sex Delivery Anes PTL Lv   2 Term 10/2008 38w0d   F Vag-Spont EPI N SHANE   1 Term 02/2002 38w0d  6 lb 5 oz (2.863 kg) F Vag-Spont EPI N SHANE       ROS:    General:  No wt loss, wt gain, appetite changes.  Breasts:  No pain, lumps or secretions.  :   No urgency, frequency, BOWEN, bladder problems in past.              BP 94/62   Pulse 82   Wt 118 lb (53.5 kg)   LMP 01/08/2024 (Exact Date)     VULVA:  No lesions or erythema.  VAGINA:  No lesions, scant discharge.  CERVIX:  No lesions or CMT.  UTERUS:  retroflexed and normal size.  ADNEXA:  No pain or masses.    IMP/PLAN:    1. PMS (premenstrual syndrome)  - Norethindrone (ORTHO MICRONOR) 0.35 MG Oral Tab; Take 1 tablet (0.35 mg total) by mouth daily.  Dispense: 84 tablet; Refill: 0    2. Dysmenorrhea  Could consider pelvic floor PT  - Norethindrone (ORTHO MICRONOR) 0.35 MG Oral Tab; Take 1 tablet (0.35 mg total) by mouth daily.  Dispense: 84 tablet; Refill: 0    See in 3 months for annual exam/ prn.

## 2024-03-15 ENCOUNTER — HOSPITAL ENCOUNTER (OUTPATIENT)
Dept: MRI IMAGING | Facility: HOSPITAL | Age: 39
Discharge: HOME | End: 2024-03-15
Attending: STUDENT IN AN ORGANIZED HEALTH CARE EDUCATION/TRAINING PROGRAM
Payer: COMMERCIAL

## 2024-03-15 ENCOUNTER — PATIENT MESSAGE (OUTPATIENT)
Dept: FAMILY MEDICINE CLINIC | Facility: CLINIC | Age: 39
End: 2024-03-15

## 2024-03-15 DIAGNOSIS — R20.0 NUMBNESS: ICD-10-CM

## 2024-03-15 DIAGNOSIS — R51.9 WORSENING HEADACHES: ICD-10-CM

## 2024-03-15 PROCEDURE — 70553 MRI BRAIN STEM W/O & W/DYE: CPT | Performed by: STUDENT IN AN ORGANIZED HEALTH CARE EDUCATION/TRAINING PROGRAM

## 2024-03-15 PROCEDURE — A9575 INJ GADOTERATE MEGLUMI 0.1ML: HCPCS | Performed by: STUDENT IN AN ORGANIZED HEALTH CARE EDUCATION/TRAINING PROGRAM

## 2024-03-15 RX ORDER — DIPHENHYDRAMINE HYDROCHLORIDE 50 MG/ML
10 INJECTION, SOLUTION INTRAMUSCULAR; INTRAVENOUS
Status: COMPLETED | OUTPATIENT
Start: 2024-03-15 | End: 2024-03-15

## 2024-03-15 RX ADMIN — DIPHENHYDRAMINE HYDROCHLORIDE 10 ML: 50 INJECTION, SOLUTION INTRAMUSCULAR; INTRAVENOUS at 14:15:00

## 2024-03-18 NOTE — TELEPHONE ENCOUNTER
From: Georgette Olguin  To: Viktoriya Quinteros  Sent: 3/15/2024 3:40 PM CDT  Subject: MRI Brain    Hello Dr Quinteros,     I was able to get the MRI done today at 1:30pmcst. Wondering if you’ll call to discuss results or should I schedule an appointment? I see you have a 1:50pm appointment available but states it’s a new patient appointment so I was unsure if I should take it.     Please share your thoughts in the best way for us to connect and discuss results and next steps. The numbing and headaches have remain the same, daily and I don’t really take anything for either symptoms as I’m not sure what to take.     Looking forward to your reply.     Thanks,  Georgette Olguin

## 2024-03-18 NOTE — PROGRESS NOTES
Gabo Palomino,     Your brain MRI results overall are reassuring. It did not show any signs of abnormalities. Please complete your bloodwork when you can. It would be best to follow-up either in office or virtually once you have had a chance to complete your labs so that we could discuss your results and next steps for your plan of care. Please let me know if you have any questions in the meantime.     Please take care,  Dr. Quinteros

## 2024-04-18 ENCOUNTER — LAB ENCOUNTER (OUTPATIENT)
Dept: LAB | Age: 39
End: 2024-04-18
Attending: STUDENT IN AN ORGANIZED HEALTH CARE EDUCATION/TRAINING PROGRAM
Payer: COMMERCIAL

## 2024-04-18 DIAGNOSIS — R53.83 FATIGUE, UNSPECIFIED TYPE: ICD-10-CM

## 2024-04-18 DIAGNOSIS — R20.0 NUMBNESS: ICD-10-CM

## 2024-04-18 DIAGNOSIS — R51.9 WORSENING HEADACHES: ICD-10-CM

## 2024-04-18 LAB
ALBUMIN SERPL-MCNC: 4.3 G/DL (ref 3.4–5)
ALBUMIN/GLOB SERPL: 1 {RATIO} (ref 1–2)
ALP LIVER SERPL-CCNC: 76 U/L
ALT SERPL-CCNC: 21 U/L
ANION GAP SERPL CALC-SCNC: 4 MMOL/L (ref 0–18)
AST SERPL-CCNC: 16 U/L (ref 15–37)
BASOPHILS # BLD AUTO: 0.05 X10(3) UL (ref 0–0.2)
BASOPHILS NFR BLD AUTO: 0.9 %
BILIRUB SERPL-MCNC: 0.3 MG/DL (ref 0.1–2)
BUN BLD-MCNC: 7 MG/DL (ref 9–23)
CALCIUM BLD-MCNC: 10.1 MG/DL (ref 8.5–10.1)
CHLORIDE SERPL-SCNC: 105 MMOL/L (ref 98–112)
CO2 SERPL-SCNC: 28 MMOL/L (ref 21–32)
CREAT BLD-MCNC: 0.78 MG/DL
EGFRCR SERPLBLD CKD-EPI 2021: 100 ML/MIN/1.73M2 (ref 60–?)
EOSINOPHIL # BLD AUTO: 0.03 X10(3) UL (ref 0–0.7)
EOSINOPHIL NFR BLD AUTO: 0.5 %
ERYTHROCYTE [DISTWIDTH] IN BLOOD BY AUTOMATED COUNT: 12.1 %
FASTING STATUS PATIENT QL REPORTED: NO
GLOBULIN PLAS-MCNC: 4.1 G/DL (ref 2.8–4.4)
GLUCOSE BLD-MCNC: 93 MG/DL (ref 70–99)
HCT VFR BLD AUTO: 38.7 %
HGB BLD-MCNC: 12.9 G/DL
IMM GRANULOCYTES # BLD AUTO: 0.02 X10(3) UL (ref 0–1)
IMM GRANULOCYTES NFR BLD: 0.3 %
LYMPHOCYTES # BLD AUTO: 1.62 X10(3) UL (ref 1–4)
LYMPHOCYTES NFR BLD AUTO: 27.6 %
MCH RBC QN AUTO: 31.9 PG (ref 26–34)
MCHC RBC AUTO-ENTMCNC: 33.3 G/DL (ref 31–37)
MCV RBC AUTO: 95.8 FL
MONOCYTES # BLD AUTO: 0.5 X10(3) UL (ref 0.1–1)
MONOCYTES NFR BLD AUTO: 8.5 %
NEUTROPHILS # BLD AUTO: 3.65 X10 (3) UL (ref 1.5–7.7)
NEUTROPHILS # BLD AUTO: 3.65 X10(3) UL (ref 1.5–7.7)
NEUTROPHILS NFR BLD AUTO: 62.2 %
OSMOLALITY SERPL CALC.SUM OF ELEC: 282 MOSM/KG (ref 275–295)
PLATELET # BLD AUTO: 334 10(3)UL (ref 150–450)
POTASSIUM SERPL-SCNC: 4.1 MMOL/L (ref 3.5–5.1)
PROT SERPL-MCNC: 8.4 G/DL (ref 6.4–8.2)
RBC # BLD AUTO: 4.04 X10(6)UL
SODIUM SERPL-SCNC: 137 MMOL/L (ref 136–145)
TSI SER-ACNC: 0.53 MIU/ML (ref 0.36–3.74)
VIT D+METAB SERPL-MCNC: 35.9 NG/ML (ref 30–100)
WBC # BLD AUTO: 5.9 X10(3) UL (ref 4–11)

## 2024-04-18 PROCEDURE — 82306 VITAMIN D 25 HYDROXY: CPT

## 2024-04-18 PROCEDURE — 85025 COMPLETE CBC W/AUTO DIFF WBC: CPT

## 2024-04-18 PROCEDURE — 84443 ASSAY THYROID STIM HORMONE: CPT

## 2024-04-18 PROCEDURE — 36415 COLL VENOUS BLD VENIPUNCTURE: CPT

## 2024-04-18 PROCEDURE — 80053 COMPREHEN METABOLIC PANEL: CPT

## 2024-04-26 DIAGNOSIS — N94.3 PMS (PREMENSTRUAL SYNDROME): ICD-10-CM

## 2024-04-26 DIAGNOSIS — N94.6 DYSMENORRHEA: ICD-10-CM

## 2024-04-26 RX ORDER — ACETAMINOPHEN AND CODEINE PHOSPHATE 120; 12 MG/5ML; MG/5ML
0.35 SOLUTION ORAL DAILY
Qty: 84 TABLET | Refills: 0 | Status: SHIPPED | OUTPATIENT
Start: 2024-04-26

## 2024-04-26 NOTE — TELEPHONE ENCOUNTER
Last OV: 2/5/24 PMS Zenia  Last Refill Date: 2/5/24 #84 0 refills  Follow Up:  3 months 5/8/24 Zenia  Next Appt. 5/8/24     Refill done

## 2024-04-29 ENCOUNTER — TELEMEDICINE (OUTPATIENT)
Dept: FAMILY MEDICINE CLINIC | Facility: CLINIC | Age: 39
End: 2024-04-29
Payer: COMMERCIAL

## 2024-04-29 DIAGNOSIS — M79.7 FIBROMYALGIA: Primary | ICD-10-CM

## 2024-04-29 RX ORDER — GABAPENTIN 300 MG/1
600 CAPSULE ORAL 3 TIMES DAILY
Qty: 180 CAPSULE | Refills: 1 | Status: SHIPPED | OUTPATIENT
Start: 2024-04-29

## 2024-04-29 RX ORDER — TRAMADOL HYDROCHLORIDE 50 MG/1
50 TABLET ORAL EVERY 8 HOURS PRN
Qty: 20 TABLET | Refills: 0 | Status: SHIPPED | OUTPATIENT
Start: 2024-04-29 | End: 2024-05-04

## 2024-04-29 NOTE — PROGRESS NOTES
Subjective:      No chief complaint on file.    HISTORY OF PRESENT ILLNESS  HPI  HPI obtained per patient report.  Georgette Olguin is a pleasant 38 year old female presenting virtually with symptoms of a fibromyalgia flare-up.   She reports worsening of her body aches including in her shoulders and arms beginning a few days ago. She has been taking gabapentin 600 mg TID. She requests a refill of tramadol for her fibromyalgia flare.      PAST PATIENT HISTORY  Past Medical History:    Fibromyalgia    Migraines    PTSD (post-traumatic stress disorder)    Ulcerative colitis, chronic (HCC)     Past Surgical History:   Procedure Laterality Date    Cholecystectomy  2006       CURRENT MEDICATIONS  Outpatient Medications Marked as Taking for the 4/29/24 encounter (Telemedicine) with Viktoriya Quinteros MD   Medication Sig Dispense Refill    traMADol 50 MG Oral Tab Take 1 tablet (50 mg total) by mouth every 8 (eight) hours as needed for Pain. 20 tablet 0    gabapentin 300 MG Oral Cap Take 2 capsules (600 mg total) by mouth 3 (three) times daily. 180 capsule 1       HEALTH MAINTENANCE  Immunization History   Administered Date(s) Administered    >=3 YRS TRI  MULTIDOSE VIAL (44112) FLU CLINIC 10/08/2008    Covid-19 Vaccine Moderna 100 mcg/0.5 ml 03/22/2021, 04/19/2021, 05/30/2022    Covid-19 Vaccine Moderna 50 Mcg/0.25 Ml 11/30/2021    Covid-19 Vaccine Moderna Bivalent 50mcg/0.5mL 12+ years 12/05/2022    FLUZONE 6 months and older PFS 0.5 ml (70534) 10/02/2018, 12/03/2019, 10/03/2022    Influenza 10/02/2018    Pneumococcal (Prevnar 13) 10/02/2018    Pneumovax 23 07/09/2019    TDAP 07/09/2019       ALLERGIES AND DRUG REACTIONS  Allergies   Allergen Reactions    Mesalamine HIVES, ITCHING and RASH     Rash all over face and body       Family History   Problem Relation Age of Onset    Depression Mother     Diabetes Mother     Diabetes Maternal Grandfather     Stroke Maternal Grandfather     Diabetes Maternal Grandmother      Social History      Socioeconomic History    Marital status:    Tobacco Use    Smoking status: Never    Smokeless tobacco: Never   Vaping Use    Vaping status: Never Used   Substance and Sexual Activity    Alcohol use: Yes     Alcohol/week: 1.0 standard drink of alcohol     Types: 1 Glasses of wine per week     Comment: 1-3 drinks a month    Drug use: Never    Sexual activity: Yes     Partners: Male   Other Topics Concern    Caffeine Concern Yes    Stress Concern Yes    Exercise Yes    Seat Belt Yes     Social Determinants of Health     Food Insecurity: Low Risk  (10/3/2022)    Received from [facility]    Food Insecurity     Have there been times that your food ran out, and you didn't have money to get more?: No     Are there times that you worry that this might happen?: No   Transportation Needs: Low Risk  (10/3/2022)    Received from [facility]    Transportation Needs     Do you have trouble getting transportation to medical appointments?: No   Housing Stability: Low Risk  (10/3/2022)    Received from [facility]    Housing Stability     Are you concerned about having a safe and reliable place to live?: No       Review of Systems   Musculoskeletal:  Positive for myalgias.   All other systems reviewed and are negative.         Objective:      LMP 02/28/2024 (Exact Date)   There is no height or weight on file to calculate BMI.    Physical Exam  Constitutional:       General: She is not in acute distress.     Appearance: She is not ill-appearing or toxic-appearing.   Pulmonary:      Effort: Pulmonary effort is normal.   Musculoskeletal:      Cervical back: Neck supple.   Neurological:      Mental Status: She is alert and oriented to person, place, and time.   Psychiatric:         Mood and Affect: Mood normal.            Assessment and Plan:      1. Fibromyalgia  (Primary)  -     traMADol HCl; Take 1 tablet (50 mg total) by mouth every 8 (eight) hours as needed for Pain.  Dispense: 20 tablet; Refill: 0  -     Gabapentin; Take 2 capsules (600 mg total) by mouth 3 (three) times daily.  Dispense: 180 capsule; Refill: 1  -     OP REFERRAL TO PAIN MANAGEMENT    Return in about 4 months (around 8/29/2024).    Fibromyalgia  - history of depression and PTSD  - she is following up weekly with her counselor  - she has also started regular massage and chiropractic therapy  - she is planning on starting trigger point injections in the near future. She states that she is considering this with a pain management specialist in private practice or through [facility] [employer]  - fibromyalgia symptoms are inadequately controlled by  her current regimen of gabapentin and tramadol, and we discussed the significant risks of using tramadol long-term  - alternative treatment options including cymbalta, amitriptyline, and lyrica were discussed, but she is concerned about the potential side effects of these new medications and wishes to defer at this time  - refills for her current regimen were provided. Our goal for her fibromyalgia treatment is to improve her symptom control at baseline and reduce the frequency of her fibromyalgia flares    Patient verbalized understanding of assessment and recommendations. All questions and concerns were addressed.    Telehealth appointment with video and audio was scheduled and completed with the patient's informed consent. Telehealth appointment took place in context of CDC social distancing guidelines during the Covid-19 pandemic.    Electronically signed by Viktoriya Quinteros MD

## 2024-04-30 ENCOUNTER — TELEPHONE (OUTPATIENT)
Dept: FAMILY MEDICINE CLINIC | Facility: CLINIC | Age: 39
End: 2024-04-30

## 2024-05-06 ENCOUNTER — TELEPHONE (OUTPATIENT)
Dept: PAIN CLINIC | Facility: CLINIC | Age: 39
End: 2024-05-06

## 2024-05-06 ENCOUNTER — OFFICE VISIT (OUTPATIENT)
Dept: PAIN CLINIC | Facility: CLINIC | Age: 39
End: 2024-05-06
Payer: COMMERCIAL

## 2024-05-06 VITALS — DIASTOLIC BLOOD PRESSURE: 58 MMHG | OXYGEN SATURATION: 99 % | HEART RATE: 78 BPM | SYSTOLIC BLOOD PRESSURE: 100 MMHG

## 2024-05-06 DIAGNOSIS — G89.29 CHRONIC NECK PAIN: Primary | ICD-10-CM

## 2024-05-06 DIAGNOSIS — M54.2 CHRONIC NECK PAIN: Primary | ICD-10-CM

## 2024-05-06 RX ORDER — TRAMADOL HYDROCHLORIDE 50 MG/1
50 TABLET ORAL DAILY PRN
COMMUNITY

## 2024-05-06 NOTE — PROGRESS NOTES
Subjective:   Patient ID: Georgette Olguin is a 38 year old female.    HPI    History/Other:   Review of Systems  Current Outpatient Medications   Medication Sig Dispense Refill   • gabapentin 300 MG Oral Cap Take 2 capsules (600 mg total) by mouth 3 (three) times daily. 180 capsule 1   • NORETHINDRONE 0.35 MG Oral Tab TAKE 1 TABLET BY MOUTH EVERY DAY 84 tablet 0   • ALPRAZolam (XANAX) 0.5 MG Oral Tab Take 1 to 2 tablets by mouth as needed for anxiety associated with brain MRI scan 2 tablet 0   • Multiple Vitamins-Minerals (MULTIVIT/MULTIMINERAL ADULT OR) Take by mouth As Directed.     • acetaminophen 500 MG Oral Tab Take 2 tablets (1,000 mg total) by mouth every 6 (six) hours as needed.     • ondansetron 4 MG Oral Tablet Dispersible Take 1 tablet (4 mg total) by mouth every 8 (eight) hours as needed.     • XELJANZ 5 MG Oral Tab TAKE 1 TABLET TWICE DAILY. MAY BE TAKEN WITH OR WITHOUT FOOD. STORE AT ROOM TEMPERATURE       Allergies:  Allergies   Allergen Reactions   • Mesalamine HIVES, ITCHING and RASH     Rash all over face and body       Objective:   Physical Exam  Constitutional:          Assessment & Plan:   No diagnosis found.    No orders of the defined types were placed in this encounter.      Meds This Visit:  Requested Prescriptions      No prescriptions requested or ordered in this encounter       Imaging & Referrals:  None    Location of Pain: bilateral neck, bilateral elbows and hands, bilateral low back and hips, bilateral knees, bilateral feet    Date Pain Began: Diagnosed with Fibromyalgia 2018          Work Related:   No        Receiving Work Comp/Disability:   No    Numeric Rating Scale:  Pain at Present:  5/10                                                                                                            (No Pain) 0  to  10 (Worst Pain)                 Minimum Pain:   2  Maximum Pain  9    Distribution of Pain:    bilateral    Quality of Pain:   aching, numbness, and tingling    Origin of  Pain:    Disease related Fibromyalgia    Aggravating Factors:    Cold and Other pain is worst in the mornings    Past Treatments for Current Pain Condition:   NSAIDS and Other gabapentin, tramadol, heat    Prior diagnostic testing for your pain:  n/a

## 2024-05-06 NOTE — PROGRESS NOTES
Patient: Georgette Olguin  Medical Record Number: [id number]  Site: [facility]  Referring Physician:  Viktoriya Quinteros  PCP: same    Dear Dr. Quinteros:    Thank you very much for requesting this consultation. I had the opportunity to evaluate and initiate care for your patient today, as per your request.    HISTORY OF CHIEF COMPLAINT:      Georgette Olguin is a 38 year old female, who complains of diffuse pain from fibromyalgia, though specifically is concerned about neck and trapezius pain.    Patient is here today with pain in above described distribution that began atraumatically years ago.  States that she had been diagnosed with UC, and some time thereafter, underwent extensive workup for her pain, and was diagnosed by Rheum with fibro.  She has been to PT with HEP, to partial relief, and has been on neurontin to limited relief.  She is on tramadol, to partial improvement.  She discussed with PCP, and was sent for consideration of TPI.  She is working with psych for PTSD, though has not seen pain psych.      VAS Pain Score:  6/10    Hand Dominance: right  Loss of dexterity: No  Dropping things: No    Aggravating Factors: Relieving Factors:   Worse in winter  Increased activity  Limiting ADLs      Past Treatment Attempted/Patient’s Response:  As above     Past Medical History:    Fibromyalgia    Migraines    PTSD (post-traumatic stress disorder)    Ulcerative colitis, chronic (HCC)      Past Surgical History:   Procedure Laterality Date    Cholecystectomy  2006      Family History   Problem Relation Age of Onset    Depression Mother     Diabetes Mother     Diabetes Maternal Grandfather     Stroke Maternal Grandfather     Diabetes Maternal Grandmother       Social History     Socioeconomic History    Marital status:    Tobacco Use    Smoking status: Never    Smokeless tobacco: Never   Vaping Use    Vaping status: Never Used   Substance and Sexual Activity    Alcohol use: Yes     Alcohol/week: 1.0  standard drink of alcohol     Types: 1 Glasses of wine per week     Comment: 1-3 drinks a month    Drug use: Never    Sexual activity: Yes     Partners: Male   Other Topics Concern    Caffeine Concern Yes    Stress Concern Yes    Exercise Yes    Seat Belt Yes      Current Medications:  Current Outpatient Medications   Medication Sig Dispense Refill    traMADol 50 MG Oral Tab Take 1 tablet (50 mg total) by mouth daily as needed for Pain.      gabapentin 300 MG Oral Cap Take 2 capsules (600 mg total) by mouth 3 (three) times daily. 180 capsule 1    Multiple Vitamins-Minerals (MULTIVIT/MULTIMINERAL ADULT OR) Take by mouth As Directed.      acetaminophen 500 MG Oral Tab Take 2 tablets (1,000 mg total) by mouth every 6 (six) hours as needed.      ondansetron 4 MG Oral Tablet Dispersible Take 1 tablet (4 mg total) by mouth every 8 (eight) hours as needed.      XELJANZ 5 MG Oral Tab TAKE 1 TABLET TWICE DAILY. MAY BE TAKEN WITH OR WITHOUT FOOD. STORE AT ROOM TEMPERATURE          Functional Assessment: Patient reports that they are able to complete all of their ADL's such as eating, bathing, using the toilet, dressing and getting up from a bed or a chair independently.    Work History:  The patient currently works full time as  for [employer].       REVIEW OF SYSTEMS:   10 point review of systems is otherwise negative,unless otherwise in HPI.      Radiology/Lab Test Reviewed:  no relevant imaging    CBC:    Lab Results   Component Value Date    WBC 5.9 04/18/2024   No results found for: \"HEMOGLOBIN\"  Lab Results   Component Value Date    .0 04/18/2024       PHYSICAL EXAMIMATION   PHYSICAL EXAMINATION: Georgette Olguin is a 38 year old female who is observed sitting comfortably on a chair in the exam room alert and oriented times three. She looks consistent with her stated age.    Ht Readings from Last 1 Encounters:   03/13/24 62\"     Wt Readings from Last 1 Encounters:   03/13/24 115 lb 6 oz (52.3 kg)     The patient  is well developed, well nourished, normal body habitus, well muscled. She moves independently from sitting to standing with ease.       Coordination:  Well coordinated; able to engage in rapid alternating movements bilateral upper extremities    Tandem Walk: Able    ROM Cervical Spine:  See chart below:  Motion Right (+ or -) Left (+ or -)   Cervical flexion + +   Cervical extension + +   Cervical lateral bending - -   Cervical rotation + +     Integument:  Skin over area of cervical spine intact; no erythema, rashes, excoriations, lesions noted    Palpation:  See chart below:  Palpation of Right (+ or -) Left (+ or -)   Cervical Facets - -   Thoracic Facets - -   Paraspinal - -   Trapezius + +   Scapula - -   Occipital - -     DTR:  DTR grossly intact throughout bilateral upper extremities, 2/4 throughout    Strength:  Strength of bilateral upper extremities grossly intact; 5/5 throughout    Sensation:  Normal sensation noted to light touch and pressure throughout bilateral upper extremities.    Tests:  Test Right (+ or -) Left (+ or -)   Spurling - -   Valiente’s Test     Clonus       HEAD/NECK: Head is normocephalic, neck supple  EYES: EOMI, LATONYA  LYMPH EXAM: There is no lymph edema in either lower extremity.  VASCULAR EXAM: Radial pulses are normal bilaterally, with good distal perfusion. No clubbing or cyanosis.  HEART: normal, regular, S1 and S2  LUNGS: CTA  MUSCULOSKELETAL: Smooth, pain-free ROM to bilateral shoulders,elbows, wrists and digits.    Do you have any known blood/bleeding disorders?  No  Does patient currently take blood thinners?   None  Does patient currently take any antibiotics?   No    Patient is currently on pain medications:  No  Reason pain medications are prescribed: N/A  Pain medications are prescribed by: N/A  [place] Prescription Monitoring review: N/A  DIRE: N/A  Treatment decision: N/A    MEDICAL DECISION MAKING:   Impression: myofascial pain, chronic neck pain,  fibromyalgia    Patient has 10+ year history of chronic, diffuse pain, today, moreso in B traps, with known history of fibromyalgia.  She is managed on neurontin, to partial relief, and is hesitant to consider lyrica, cymbalta, or savella.  She is not seeing rheum or physiatry, though is seeing psych for PTSD.  She uses tramadol for pain, though mainly for flares.      On exam, pain with ROM C spine, more significant pain to palpation R>L traps, though certainly diffusely tender to palpation.  No focal sensory/motor deficits.      We discussed options, and will have her evaluated by pain psych.  In addition, will have her work with integrative medicine.  Could consider TPI, should conservative management fail.      Plan: acupuncture, pain psych.  XR C spine.  F/u if pain persists.         The patient indicates understanding of these issues and agrees to the plan.      Thank you very much.     Respectfully yours,    KANDICE Jules

## 2024-05-06 NOTE — PATIENT INSTRUCTIONS
Refill policies:    Allow 2-3 business days for refills; controlled substances may take longer.  Contact your pharmacy at least 5 days prior to running out of medication and have them send an electronic request or submit request through the “request refill” option in your [employer] account.  Refills are not addressed on weekends; covering physicians do not authorize routine medications on weekends.  No narcotics or controlled substances are refilled after noon on Fridays or by on call physicians.  By law, narcotics must be electronically prescribed.  A 30 day supply with no refills is the maximum allowed.  If your prescription is due for a refill, you may be due for a follow up appointment.  To best provide you care, patients receiving routine medications need to be seen at least once a year.  Patients receiving narcotic/controlled substance medications need to be seen at least once every 3 months.  In the event that your preferred pharmacy does not have the requested medication in stock (e.g. Backordered), it is your responsibility to find another pharmacy that has the requested medication available.  We will gladly send a new prescription to that pharmacy at your request.    Scheduling Tests:    If your physician has ordered radiology tests such as MRI or CT scans, please contact Central Scheduling at [phone] right away to schedule the test.  Once scheduled, the [facility] Centralized Referral Team will work with your insurance carrier to obtain pre-certification or prior authorization.  Depending on your insurance carrier, approval may take 3-10 days.  It is highly recommended patients assure they have received an authorization before having a test performed.  If test is done without insurance authorization, patient may be responsible for the entire amount billed.      Precertification and Prior Authorizations:  If your physician has recommended that you have a procedure or additional testing performed the [facility]  Centralized Referral Team will contact your insurance carrier to obtain pre-certification or prior authorization.    You are strongly encouraged to contact your insurance carrier to verify that your procedure/test has been approved and is a COVERED benefit.  Although the [facility] Centralized Referral Team does its due diligence, the insurance carrier gives the disclaimer that \"Although the procedure is authorized, this does not guarantee payment.\"    Ultimately the patient is responsible for payment.   Thank you for your understanding in this matter.  Paperwork Completion:  If you require FMLA or disability paperwork for your recovery, please make sure to either drop it off or have it faxed to our office at [phone]. Be sure the form has your name and date of birth on it.  The form will be faxed to our Forms Department and they will complete it for you.  There is a 25$ fee for all forms that need to be filled out.  Please be aware there is a 10-14 day turnaround time.  You will need to sign a release of information (ANI) form if your paperwork does not come with one.  You may call the Forms Department with any questions at [phone].  Their fax number is [phone].

## 2024-05-10 ENCOUNTER — TELEPHONE (OUTPATIENT)
Age: 39
End: 2024-05-10

## 2024-05-10 NOTE — TELEPHONE ENCOUNTER
Hello,    Thank you for speaking with me this morning. I'm from the [facility] Navigation department, following up on an order from your provider's office to assist in connecting you with resources for behavioral health support. I know you are already connected with a provider at this time, but wanted to be sure you had access to our number - please feel free to give us a call back at [phone] if you ever need. Take care.

## 2024-06-24 ENCOUNTER — TELEPHONE (OUTPATIENT)
Dept: FAMILY MEDICINE CLINIC | Facility: CLINIC | Age: 39
End: 2024-06-24

## 2024-06-24 NOTE — TELEPHONE ENCOUNTER
Received a fax from [facility] stating \"insurance wont cover 6 caps/day - please change RX to 600mg tabs 1 tablets TID\"    -please advise if ok to change.

## 2024-06-25 RX ORDER — GABAPENTIN 600 MG/1
600 TABLET ORAL 3 TIMES DAILY
Qty: 270 TABLET | Refills: 0 | Status: SHIPPED | OUTPATIENT
Start: 2024-06-25

## 2024-10-15 ENCOUNTER — LAB ENCOUNTER (OUTPATIENT)
Dept: LAB | Age: 39
End: 2024-10-15
Attending: INTERNAL MEDICINE
Payer: COMMERCIAL

## 2024-10-15 DIAGNOSIS — K51.90 ULCERATIVE COLITIS (HCC): Primary | ICD-10-CM

## 2024-10-15 LAB
ALBUMIN SERPL-MCNC: 4.9 G/DL (ref 3.2–4.8)
ALBUMIN/GLOB SERPL: 1.7 {RATIO} (ref 1–2)
ALP LIVER SERPL-CCNC: 53 U/L
ALT SERPL-CCNC: 19 U/L
ANION GAP SERPL CALC-SCNC: 4 MMOL/L (ref 0–18)
AST SERPL-CCNC: 27 U/L (ref ?–34)
BASOPHILS # BLD AUTO: 0.03 X10(3) UL (ref 0–0.2)
BASOPHILS NFR BLD AUTO: 0.5 %
BILIRUB SERPL-MCNC: 0.4 MG/DL (ref 0.3–1.2)
BUN BLD-MCNC: 14 MG/DL (ref 9–23)
CALCIUM BLD-MCNC: 9.5 MG/DL (ref 8.7–10.4)
CHLORIDE SERPL-SCNC: 106 MMOL/L (ref 98–112)
CO2 SERPL-SCNC: 24 MMOL/L (ref 21–32)
CREAT BLD-MCNC: 0.74 MG/DL
CRP SERPL-MCNC: <0.4 MG/DL (ref ?–0.5)
DEPRECATED HBV CORE AB SER IA-ACNC: 51 NG/ML
EGFRCR SERPLBLD CKD-EPI 2021: 105 ML/MIN/1.73M2 (ref 60–?)
EOSINOPHIL # BLD AUTO: 0.12 X10(3) UL (ref 0–0.7)
EOSINOPHIL NFR BLD AUTO: 2.1 %
ERYTHROCYTE [DISTWIDTH] IN BLOOD BY AUTOMATED COUNT: 12.7 %
FASTING STATUS PATIENT QL REPORTED: YES
FOLATE SERPL-MCNC: 22.2 NG/ML (ref 5.4–?)
GLOBULIN PLAS-MCNC: 2.9 G/DL (ref 2–3.5)
GLUCOSE BLD-MCNC: 82 MG/DL (ref 70–99)
HCT VFR BLD AUTO: 35.6 %
HGB BLD-MCNC: 12.1 G/DL
IMM GRANULOCYTES # BLD AUTO: 0.02 X10(3) UL (ref 0–1)
IMM GRANULOCYTES NFR BLD: 0.4 %
IRON SATN MFR SERPL: 41 %
IRON SERPL-MCNC: 126 UG/DL
LYMPHOCYTES # BLD AUTO: 1.55 X10(3) UL (ref 1–4)
LYMPHOCYTES NFR BLD AUTO: 27.3 %
MCH RBC QN AUTO: 32.4 PG (ref 26–34)
MCHC RBC AUTO-ENTMCNC: 34 G/DL (ref 31–37)
MCV RBC AUTO: 95.2 FL
MONOCYTES # BLD AUTO: 0.39 X10(3) UL (ref 0.1–1)
MONOCYTES NFR BLD AUTO: 6.9 %
NEUTROPHILS # BLD AUTO: 3.56 X10 (3) UL (ref 1.5–7.7)
NEUTROPHILS # BLD AUTO: 3.56 X10(3) UL (ref 1.5–7.7)
NEUTROPHILS NFR BLD AUTO: 62.8 %
OSMOLALITY SERPL CALC.SUM OF ELEC: 278 MOSM/KG (ref 275–295)
PLATELET # BLD AUTO: 305 10(3)UL (ref 150–450)
POTASSIUM SERPL-SCNC: 3.7 MMOL/L (ref 3.5–5.1)
PROT SERPL-MCNC: 7.8 G/DL (ref 5.7–8.2)
RBC # BLD AUTO: 3.74 X10(6)UL
SODIUM SERPL-SCNC: 134 MMOL/L (ref 136–145)
TOTAL IRON BINDING CAPACITY: 307 UG/DL (ref 250–425)
TRANSFERRIN SERPL-MCNC: 243 MG/DL (ref 250–380)
VIT B12 SERPL-MCNC: 862 PG/ML (ref 211–911)
VIT D+METAB SERPL-MCNC: 42.9 NG/ML (ref 30–100)
WBC # BLD AUTO: 5.7 X10(3) UL (ref 4–11)

## 2024-10-15 PROCEDURE — 82306 VITAMIN D 25 HYDROXY: CPT

## 2024-10-15 PROCEDURE — 83540 ASSAY OF IRON: CPT

## 2024-10-15 PROCEDURE — 86140 C-REACTIVE PROTEIN: CPT

## 2024-10-15 PROCEDURE — 82746 ASSAY OF FOLIC ACID SERUM: CPT

## 2024-10-15 PROCEDURE — 36415 COLL VENOUS BLD VENIPUNCTURE: CPT

## 2024-10-15 PROCEDURE — 82728 ASSAY OF FERRITIN: CPT

## 2024-10-15 PROCEDURE — 83550 IRON BINDING TEST: CPT

## 2024-10-15 PROCEDURE — 82607 VITAMIN B-12: CPT

## 2024-10-15 PROCEDURE — 80053 COMPREHEN METABOLIC PANEL: CPT

## 2024-10-15 PROCEDURE — 85025 COMPLETE CBC W/AUTO DIFF WBC: CPT

## 2024-10-16 ENCOUNTER — LAB ENCOUNTER (OUTPATIENT)
Dept: LAB | Age: 39
End: 2024-10-16
Attending: FAMILY MEDICINE
Payer: COMMERCIAL

## 2024-10-16 DIAGNOSIS — K51.919 CHRONIC ULCERATIVE COLITIS, UNSPECIFIED COMPLICATION (HCC): Primary | ICD-10-CM

## 2024-10-16 PROCEDURE — 87209 SMEAR COMPLEX STAIN: CPT

## 2024-10-16 PROCEDURE — 83993 ASSAY FOR CALPROTECTIN FECAL: CPT

## 2024-10-16 PROCEDURE — 87427 SHIGA-LIKE TOXIN AG IA: CPT

## 2024-10-16 PROCEDURE — 87015 SPECIMEN INFECT AGNT CONCNTJ: CPT

## 2024-10-16 PROCEDURE — 87045 FECES CULTURE AEROBIC BACT: CPT

## 2024-10-16 PROCEDURE — 87177 OVA AND PARASITES SMEARS: CPT

## 2024-10-16 PROCEDURE — 87046 STOOL CULTR AEROBIC BACT EA: CPT

## 2024-10-17 ENCOUNTER — HOSPITAL ENCOUNTER (EMERGENCY)
Age: 39
Discharge: HOME | End: 2024-10-17
Attending: EMERGENCY MEDICINE
Payer: COMMERCIAL

## 2024-10-17 ENCOUNTER — TELEPHONE (OUTPATIENT)
Dept: FAMILY MEDICINE CLINIC | Facility: CLINIC | Age: 39
End: 2024-10-17

## 2024-10-17 ENCOUNTER — APPOINTMENT (OUTPATIENT)
Dept: CT IMAGING | Age: 39
End: 2024-10-17
Attending: NURSE PRACTITIONER
Payer: COMMERCIAL

## 2024-10-17 VITALS
TEMPERATURE: 99 F | BODY MASS INDEX: 27.6 KG/M2 | WEIGHT: 150 LBS | SYSTOLIC BLOOD PRESSURE: 97 MMHG | OXYGEN SATURATION: 100 % | DIASTOLIC BLOOD PRESSURE: 67 MMHG | RESPIRATION RATE: 18 BRPM | HEART RATE: 72 BPM | HEIGHT: 62 IN

## 2024-10-17 DIAGNOSIS — K52.9 COLITIS: Primary | ICD-10-CM

## 2024-10-17 LAB
ALBUMIN SERPL-MCNC: 4 G/DL (ref 3.4–5)
ALBUMIN/GLOB SERPL: 1.1 {RATIO} (ref 1–2)
ALP LIVER SERPL-CCNC: 55 U/L
ALT SERPL-CCNC: 27 U/L
ANION GAP SERPL CALC-SCNC: 7 MMOL/L (ref 0–18)
AST SERPL-CCNC: 19 U/L (ref 15–37)
B-HCG UR QL: NEGATIVE
BASOPHILS # BLD AUTO: 0.04 X10(3) UL (ref 0–0.2)
BASOPHILS NFR BLD AUTO: 0.4 %
BILIRUB SERPL-MCNC: 0.4 MG/DL (ref 0.1–2)
BILIRUB UR QL STRIP.AUTO: NEGATIVE
BUN BLD-MCNC: 19 MG/DL (ref 9–23)
CALCIUM BLD-MCNC: 9.3 MG/DL (ref 8.5–10.1)
CALPROTECTIN STL-MCNT: 293 ΜG/G (ref ?–50)
CHLORIDE SERPL-SCNC: 105 MMOL/L (ref 98–112)
CLARITY UR REFRACT.AUTO: CLEAR
CO2 SERPL-SCNC: 24 MMOL/L (ref 21–32)
COLOR UR AUTO: YELLOW
CREAT BLD-MCNC: 0.7 MG/DL
CRP SERPL-MCNC: <0.29 MG/DL (ref ?–0.3)
EGFRCR SERPLBLD CKD-EPI 2021: 113 ML/MIN/1.73M2 (ref 60–?)
EOSINOPHIL # BLD AUTO: 0.09 X10(3) UL (ref 0–0.7)
EOSINOPHIL NFR BLD AUTO: 1 %
ERYTHROCYTE [DISTWIDTH] IN BLOOD BY AUTOMATED COUNT: 12.5 %
ERYTHROCYTE [SEDIMENTATION RATE] IN BLOOD: 26 MM/HR
GLOBULIN PLAS-MCNC: 3.7 G/DL (ref 2.8–4.4)
GLUCOSE BLD-MCNC: 95 MG/DL (ref 70–99)
GLUCOSE UR STRIP.AUTO-MCNC: NEGATIVE MG/DL
HCT VFR BLD AUTO: 35.3 %
HGB BLD-MCNC: 12.1 G/DL
IMM GRANULOCYTES # BLD AUTO: 0.04 X10(3) UL (ref 0–1)
IMM GRANULOCYTES NFR BLD: 0.4 %
KETONES UR STRIP.AUTO-MCNC: 40 MG/DL
LEUKOCYTE ESTERASE UR QL STRIP.AUTO: NEGATIVE
LIPASE SERPL-CCNC: 33 U/L (ref 12–53)
LYMPHOCYTES # BLD AUTO: 1.48 X10(3) UL (ref 1–4)
LYMPHOCYTES NFR BLD AUTO: 16.3 %
MCH RBC QN AUTO: 32.4 PG (ref 26–34)
MCHC RBC AUTO-ENTMCNC: 34.3 G/DL (ref 31–37)
MCV RBC AUTO: 94.4 FL
MONOCYTES # BLD AUTO: 0.77 X10(3) UL (ref 0.1–1)
MONOCYTES NFR BLD AUTO: 8.5 %
NEUTROPHILS # BLD AUTO: 6.66 X10 (3) UL (ref 1.5–7.7)
NEUTROPHILS # BLD AUTO: 6.66 X10(3) UL (ref 1.5–7.7)
NEUTROPHILS NFR BLD AUTO: 73.4 %
NITRITE UR QL STRIP.AUTO: NEGATIVE
OSMOLALITY SERPL CALC.SUM OF ELEC: 284 MOSM/KG (ref 275–295)
PH UR STRIP.AUTO: 5 [PH] (ref 5–8)
PLATELET # BLD AUTO: 345 10(3)UL (ref 150–450)
POTASSIUM SERPL-SCNC: 3.8 MMOL/L (ref 3.5–5.1)
PROT SERPL-MCNC: 7.7 G/DL (ref 6.4–8.2)
PROT UR STRIP.AUTO-MCNC: NEGATIVE MG/DL
RBC # BLD AUTO: 3.74 X10(6)UL
SODIUM SERPL-SCNC: 136 MMOL/L (ref 136–145)
SP GR UR STRIP.AUTO: >=1.03 (ref 1–1.03)
UROBILINOGEN UR STRIP.AUTO-MCNC: 0.2 MG/DL
WBC # BLD AUTO: 9.1 X10(3) UL (ref 4–11)

## 2024-10-17 PROCEDURE — 96375 TX/PRO/DX INJ NEW DRUG ADDON: CPT

## 2024-10-17 PROCEDURE — 86140 C-REACTIVE PROTEIN: CPT | Performed by: NURSE PRACTITIONER

## 2024-10-17 PROCEDURE — 99285 EMERGENCY DEPT VISIT HI MDM: CPT

## 2024-10-17 PROCEDURE — 80053 COMPREHEN METABOLIC PANEL: CPT | Performed by: NURSE PRACTITIONER

## 2024-10-17 PROCEDURE — 85652 RBC SED RATE AUTOMATED: CPT | Performed by: NURSE PRACTITIONER

## 2024-10-17 PROCEDURE — 81025 URINE PREGNANCY TEST: CPT

## 2024-10-17 PROCEDURE — 99284 EMERGENCY DEPT VISIT MOD MDM: CPT

## 2024-10-17 PROCEDURE — 85025 COMPLETE CBC W/AUTO DIFF WBC: CPT | Performed by: NURSE PRACTITIONER

## 2024-10-17 PROCEDURE — 96374 THER/PROPH/DIAG INJ IV PUSH: CPT

## 2024-10-17 PROCEDURE — 81015 MICROSCOPIC EXAM OF URINE: CPT | Performed by: NURSE PRACTITIONER

## 2024-10-17 PROCEDURE — 74177 CT ABD & PELVIS W/CONTRAST: CPT | Performed by: NURSE PRACTITIONER

## 2024-10-17 PROCEDURE — 81001 URINALYSIS AUTO W/SCOPE: CPT | Performed by: NURSE PRACTITIONER

## 2024-10-17 PROCEDURE — 83690 ASSAY OF LIPASE: CPT | Performed by: NURSE PRACTITIONER

## 2024-10-17 PROCEDURE — 96361 HYDRATE IV INFUSION ADD-ON: CPT

## 2024-10-17 RX ORDER — HYDROMORPHONE HYDROCHLORIDE 1 MG/ML
1 INJECTION, SOLUTION INTRAMUSCULAR; INTRAVENOUS; SUBCUTANEOUS ONCE
Status: COMPLETED | OUTPATIENT
Start: 2024-10-17 | End: 2024-10-17

## 2024-10-17 RX ORDER — METOCLOPRAMIDE HYDROCHLORIDE 5 MG/ML
5 INJECTION INTRAMUSCULAR; INTRAVENOUS ONCE
Status: COMPLETED | OUTPATIENT
Start: 2024-10-17 | End: 2024-10-17

## 2024-10-17 RX ORDER — ONDANSETRON 2 MG/ML
4 INJECTION INTRAMUSCULAR; INTRAVENOUS ONCE
Status: COMPLETED | OUTPATIENT
Start: 2024-10-17 | End: 2024-10-17

## 2024-10-17 RX ORDER — HYDROMORPHONE HYDROCHLORIDE 1 MG/ML
0.5 INJECTION, SOLUTION INTRAMUSCULAR; INTRAVENOUS; SUBCUTANEOUS ONCE
Status: DISCONTINUED | OUTPATIENT
Start: 2024-10-17 | End: 2024-10-17

## 2024-10-17 NOTE — TELEPHONE ENCOUNTER
Called patient. Informed her that she will need an order from the GI specialist for the IV infusion. Patient states that she has a telemed appointment today with specialist and will request order. Infusion clinic phone number and fax number given to patient. Patient verbalized understanding.    Infusion Center  Telephone: [phone]  Fax: [phone]

## 2024-10-17 NOTE — TELEPHONE ENCOUNTER
Patient calling, labs ordered by GI-Dr Chaney. GI recommends for patient to go get IV fluid due to abnormal lab results. Patient advised to call PCP for orders.

## 2024-10-18 ENCOUNTER — LAB ENCOUNTER (OUTPATIENT)
Dept: LAB | Age: 39
End: 2024-10-18
Attending: STUDENT IN AN ORGANIZED HEALTH CARE EDUCATION/TRAINING PROGRAM
Payer: COMMERCIAL

## 2024-10-18 DIAGNOSIS — K51.919 CHRONIC ULCERATIVE COLITIS, UNSPECIFIED COMPLICATION (HCC): ICD-10-CM

## 2024-10-18 PROBLEM — K52.9 COLITIS: Status: ACTIVE | Noted: 2024-10-18

## 2024-10-18 PROCEDURE — 87493 C DIFF AMPLIFIED PROBE: CPT

## 2024-10-18 NOTE — ED INITIAL ASSESSMENT (HPI)
Patient arrives from home with c/o abdominal pina, diarrhea with blood and mucous, hx. Of ulcerative colitis. PCP called her and directed to ER after outpatient labs for further evaluation

## 2024-10-18 NOTE — DISCHARGE INSTRUCTIONS
Take the medications which your gastroenterologist prescribed, which you tell me is prednisone and Zofran.  Call your gastroenterologist tomorrow to arrange a follow-up appointment.  Return to the ER for new or worsening symptoms.

## 2024-10-18 NOTE — ED PROVIDER NOTES
Patient Seen in: [facility] In [place]      History     Chief Complaint   Patient presents with    Abdomen/Flank Pain     Stated Complaint: ulcerative colitis flair    Subjective:   39-year-old female with history of ulcerative colitis, here for possible ulcerative colitis exacerbation/flareup.  States she has had 2 weeks of loose and mucousy bowel movement with blood.  States she did contact her primary care doctor, who did labs for her a few days ago, and was told she needed to come in as she may be dehydrated.  Has had some nausea.  Most recent travel was in August in [place].  No known fevers.  History of cholecystectomy.              Objective:     Past Medical History:    Fibromyalgia    Migraines    PTSD (post-traumatic stress disorder)    Ulcerative colitis, chronic (HCC)              Past Surgical History:   Procedure Laterality Date    Cholecystectomy  2006                Social History     Socioeconomic History    Marital status:    Tobacco Use    Smoking status: Never    Smokeless tobacco: Never   Vaping Use    Vaping status: Never Used   Substance and Sexual Activity    Alcohol use: Yes     Alcohol/week: 1.0 standard drink of alcohol     Types: 1 Glasses of wine per week     Comment: 1-3 drinks a month    Drug use: Never    Sexual activity: Yes     Partners: Male   Other Topics Concern    Caffeine Concern Yes    Stress Concern Yes    Exercise Yes    Seat Belt Yes     Social Drivers of Health     Food Insecurity: Low Risk  (10/3/2022)    Received from [facility]    Food Insecurity     Have there been times that your food ran out, and you didn't have money to get more?: No     Are there times that you worry that this might happen?: No   Transportation Needs: Low Risk  (10/3/2022)    Received from [facility]    Transportation Needs     Do you have trouble getting  transportation to medical appointments?: No   Housing Stability: Low Risk  (10/3/2022)    [facility]    Housing Stability     Are you concerned about having a safe and reliable place to live?: No                  Physical Exam     ED Triage Vitals [10/17/24 1913]   /77   Pulse 70   Resp 17   Temp 98.5 °F (36.9 °C)   Temp src Oral   SpO2 98 %   O2 Device None (Room air)       Current Vitals:   Vital Signs  BP: 97/67  Pulse: 72  Resp: 18  Temp: 98.5 °F (36.9 °C)  Temp src: Oral    Oxygen Therapy  SpO2: 100 %  O2 Device: None (Room air)        Physical Exam  Vitals and nursing note reviewed.   Constitutional:       Appearance: She is well-developed. She is not ill-appearing, toxic-appearing or diaphoretic.   Eyes:      General: No scleral icterus.  Cardiovascular:      Rate and Rhythm: Normal rate and regular rhythm.      Heart sounds: Normal heart sounds.   Pulmonary:      Effort: Pulmonary effort is normal. No respiratory distress.   Abdominal:      General: Abdomen is flat.      Palpations: Abdomen is soft.      Tenderness: There is abdominal tenderness in the right lower quadrant, suprapubic area and left lower quadrant. There is guarding.   Skin:     General: Skin is dry.      Coloration: Skin is not jaundiced or pale.   Neurological:      General: No focal deficit present.      Mental Status: She is alert and oriented to person, place, and time.   Psychiatric:         Mood and Affect: Mood normal.             ED Course     Labs Reviewed   CBC WITH DIFFERENTIAL WITH PLATELET - Abnormal; Notable for the following components:       Result Value    RBC 3.74 (*)     All other components within normal limits   URINALYSIS WITH CULTURE REFLEX - Abnormal; Notable for the following components:    Ketones Urine 40 (*)     Blood Urine Small (*)     All other components within normal limits   UA MICROSCOPIC ONLY, URINE - Abnormal; Notable for the  following components:    Squamous Epi. Cells Few (*)     All other components within normal limits   SED RATE, WESTERGREN (AUTOMATED) - Abnormal; Notable for the following components:    Sed Rate 26 (*)     All other components within normal limits   COMP METABOLIC PANEL (14) - Normal   LIPASE - Normal   C-REACTIVE PROTEIN - Normal   POCT PREGNANCY URINE - Normal     CT ABDOMEN+PELVIS(CONTRAST ONLY)(CPT=74177)    Result Date: 10/17/2024  PROCEDURE:  CT ABDOMEN+PELVIS (CONTRAST ONLY) (CPT=74177)  COMPARISON:  None.  INDICATIONS:  ulcerative colitis flair  TECHNIQUE:  CT scanning was performed from the dome of the diaphragm to the pubic symphysis with non-ionic intravenous contrast material. Post contrast coronal MPR imaging was performed.  Dose reduction techniques were used. Dose information is transmitted to the ACR (American College of Radiology) NRDR (National Radiology Data Registry) which includes the Dose Index Registry.  PATIENT STATED HISTORY:(As transcribed by Technologist)  Lower left and right abd yimi for 3 weeks increasing when she uses the bathroom   CONTRAST USED:  80cc of Isovue 370  FINDINGS:  LIVER:  No enlargement, atrophy, abnormal density, or significant focal lesion.  BILIARY:  Sequelae of cholecystectomy. PANCREAS:  No lesion, fluid collection, ductal dilatation, or atrophy.  SPLEEN:  No enlargement or focal lesion.  KIDNEYS:  No mass, obstruction, or calcification.  ADRENALS:  No mass or enlargement.  AORTA/VASCULAR:  No aneurysm or dissection.  RETROPERITONEUM:  No mass or adenopathy.  BOWEL/MESENTERY:  Diffuse fat infiltration of much of the colon may be sequelae of chronic inflammatory process.  Minimal overall thickening of the descending and sigmoid colon is noted.  Diverticulosis of the colon without evidence of acute diverticulitis.  The appendix is unremarkable. ABDOMINAL WALL:  No mass or hernia.  URINARY BLADDER:  No visible focal wall thickening, lesion, or calculus.  PELVIC NODES:   No adenopathy.  PELVIC ORGANS:  No visible mass.  Pelvic organs appropriate for patient age.  BONES:  No bony lesion or fracture.  LUNG BASES:  No visible pulmonary or pleural disease.  OTHER:  Negative.             CONCLUSION:   Fat infiltration of the colon is likely related to patient's history of chronic ulcerative colitis.  Mild thickening of the descending and sigmoid colon.  May be sequelae of a mild acute colitis.   LOCATION:  [facility]   Dictated by (CST): Arias Parker MD on 10/17/2024 at 10:07 PM     Finalized by (CST): Arias Parker MD on 10/17/2024 at 10:10 PM                    MDM              Medical Decision Making  Differential diagnosis initially included but was not limited to: UTI, appendicitis, diverticulitis, bowel obstruction, UC flareup, gastroenteritis    39-year-old female with 2 weeks of loose, mucus, bloody bowel movements.  No systemic symptoms.  Nausea.  There is tenderness to the lower abdomen both sides including the suprapubic region.  Labs are reassuring.  CT shows no evidence of a surgical abdomen, consistent with colitis.  Given that the WBC is normal, antibiotic is not likely needed.  Patient states she feels better.  Her gastroenterologist has already prescribed her Zofran and prednisone which she will .    Supportive/home management of diagnosis/illness/injury discussed. Red flag symptoms discussed.  Signs and symptoms/criteria that would necessitate reevaluation, including ER evaluation discussed.  Patient and/or responsible adult verbalize and agree with management and plan of care.    Speech recognition software was used during this dictation.  There may be minor errors in transcription.      Amount and/or Complexity of Data Reviewed  Labs: ordered. Decision-making details documented in ED Course.  Radiology: ordered. Decision-making details documented in ED Course.  ECG/medicine tests: ordered. Decision-making details documented in ED  Course.    Risk  Prescription drug management.        Disposition and Plan     Clinical Impression:  1. Colitis         Disposition:  Discharge  10/17/2024 10:39 pm    Follow-up:  No follow-up provider specified.        Medications Prescribed:  Current Discharge Medication List              Supplementary Documentation:

## 2024-10-19 LAB — C DIFF TOX B STL QL: NEGATIVE

## 2024-11-07 RX ORDER — GABAPENTIN 600 MG/1
600 TABLET ORAL 3 TIMES DAILY
Qty: 270 TABLET | Refills: 0 | Status: SHIPPED | OUTPATIENT
Start: 2024-11-07

## 2024-11-07 NOTE — TELEPHONE ENCOUNTER
"    HPI   Chief Complaint   Patient presents with    Headache     Pt fell yesterday. Pt hit the back of her head, pt has pain in the back of her head and fore head. Pt states she did not lose consciousness. Pt is on lexepro, which is not a blood thinner.         Patient presents to the emergency department for evaluation of a mechanical fall.  Patient states it was, \"fake Manuel's day in [place].\"  She admits to being intoxicated and had a mechanical fall on the soccer field.  She states her feet went up and she landed backwards onto her upper back and hit her head.  She denies being on any anticoagulants and states that she is on Lexapro and occasionally takes Creon.  She did not have loss of consciousness, got herself back up and continue to party the rest of the day.  She denies any drug use.  She did not have headache or any discomfort until she woke up today and, \"sobered up.\"  She is experiencing right-sided frontal headache and pain in her upper midline back.  She did take one 500 mg Tylenol for her discomfort and noticed that on the way here to the hospital she has become nauseated with her headache.  Otherwise she denies any blurred vision, tinnitus, epistaxis, malocclusion, chest pain, shortness of breath, vomiting, abdominal pain, loss of bowel or bladder control, saddle paresthesia, leg weakness, numbness or extremity injury associated with her fall.  She reports her tetanus vaccine to be up-to-date.  Her symptoms are moderate in severity and persistent nature.      History provided by:  Patient and friend   used: No                          No data recorded                Patient History   No past medical history on file.  No past surgical history on file.  No family history on file.  Social History     Tobacco Use    Smoking status: Not on file    Smokeless tobacco: Not on file   Substance Use Topics    Alcohol use: Not on file    Drug use: Not on file       Physical Exam   Visit " Requesting Gabapentin 600mg  LOV: 4/29/24 Telemedicine   RTC: 4 months  Last Relevant Labs:   Filled: 6/25/24 #270 with 0 refills    No future appointments.    Neurology Medications Ajxtbe7211/07/2024 09:31 AMProtocol Details   In person appointment or virtual visit in the past 6 mos or appointment in next 3 mos     Rx pended for approval/denial   "Vitals  /84 (BP Location: Left arm, Patient Position: Sitting)   Pulse 56   Temp 36.7 °C (98.1 °F)   Resp 15   Ht 1.676 m (5' 6\")   Wt 54.4 kg (120 lb)   SpO2 99%   BMI 19.37 kg/m²   OB Status Having periods   BSA 1.59 m²      Physical Exam  Vitals reviewed.   Constitutional:       General: She is not in acute distress.     Appearance: Normal appearance.   HENT:      Head: Normocephalic and atraumatic. No raccoon eyes, Tinsley's sign, right periorbital erythema or left periorbital erythema.      Jaw: There is normal jaw occlusion.        Right Ear: Hearing, tympanic membrane, ear canal and external ear normal.      Left Ear: Hearing, tympanic membrane, ear canal and external ear normal.      Nose: Nose normal.      Mouth/Throat:      Lips: Pink.      Mouth: Mucous membranes are moist.      Pharynx: Oropharynx is clear. Uvula midline.   Eyes:      General: Lids are normal. Vision grossly intact.      Conjunctiva/sclera: Conjunctivae normal.      Pupils: Pupils are equal, round, and reactive to light.   Cardiovascular:      Rate and Rhythm: Normal rate and regular rhythm.   Pulmonary:      Effort: Pulmonary effort is normal.      Breath sounds: Normal breath sounds.   Chest:      Chest wall: No tenderness or crepitus.   Abdominal:      General: Bowel sounds are normal.      Palpations: Abdomen is soft.      Tenderness: There is no abdominal tenderness.   Musculoskeletal:      Cervical back: Full passive range of motion without pain and neck supple. No spinous process tenderness or muscular tenderness.      Comments: ANIDNO randomly.  Patient has no pain upon palpation of the bilateral upper or bilateral lower extremities.  No pain upon palpation of the pelvis.  Patient has pain upon palpation of the midline upper thoracic spine without crepitus or deformity.  Otherwise there is no other midline vertebral tenderness, step-off or crepitus.  No paraspinal tenderness bilaterally.  No outward sign of trauma to the " posterior torso.   Skin:     General: Skin is warm and dry.      Capillary Refill: Capillary refill takes less than 2 seconds.   Neurological:      General: No focal deficit present.      Mental Status: She is alert and oriented to person, place, and time.      Sensory: Sensation is intact.      Motor: Motor function is intact.      Coordination: Coordination is intact.      Gait: Gait is intact.      Comments: Clear speech.  No facial asymmetry.  Hand grasps, pushes, pulls, dorsiflexion and plantarflexion strong and equal bilaterally.   Psychiatric:         Behavior: Behavior is cooperative.         CT head wo IV contrast   Final Result   No evidence of acute intracranial abnormality.        No acute cervical spine fracture or malalignment.             MACRO:   None        Signed by: Hasmukh Merritt 3/10/2024 10:33 PM   Dictation workstation:   [id number]      CT cervical spine wo IV contrast   Final Result   No evidence of acute intracranial abnormality.        No acute cervical spine fracture or malalignment.             MACRO:   None        Signed by: Hasmukh Merritt 3/10/2024 10:33 PM   Dictation workstation:   [id number]      CT thoracic spine wo IV contrast   Final Result   Unremarkable CT of the thoracic spine.        MACRO:   None        Signed by: Hasmukh Merritt 3/10/2024 10:35 PM   Dictation workstation:   [id number]          Labs Reviewed - No data to display      ED Course & MDM   ED Course as of 03/11/24 0117   Mon Mar 11, 2024   0030 Patient is improved.  No focal neurologic deficit.  No vomiting and tolerating p.o. fluids.  We discussed results and concussion clinic follow-up should symptoms persist.  She declines need for prescription management or school/work excuse. [NA]      ED Course User Index  [NA] WENDI Clifton-CNP         Diagnoses as of 03/11/24 0117   Traumatic injury of head, initial encounter   Concussion without loss of consciousness, initial encounter   Contusion of middle back wall of  thorax, initial encounter           Medical Decision Making  Patient presents to the emergency department for evaluation of a fall that occurred while intoxicated yesterday.  She is not on any anticoagulants and admits to a head injury.  She has no focal neurologic deficit and has symptoms of concussion.  Plan is for CT of the head without IV contrast, CT cervical without IV contrast for Nexus criteria and she has midline thoracic tenderness which will be imaged with a CT without IV contrast.  Otherwise she has no other areas of tenderness.  She does not have clinical evidence warranting labs at this time.  She was given a Zofran ODT as she does not have a history of QTc prolongation and reports only being on Lexapro.  She was ordered additional Tylenol as needed.  Patient provides consent for this plan and has no concern for pregnancy.    Imaging as interpreted by radiologist with CT of the head showing no evidence of acute intracranial abnormality.  The cervical and thoracic spine as interpreted by radiologist shows no fracture or malalignment.  Patient improved with a dose of Tylenol and Zofran ODT.    Plan is for outpatient monitoring of her concussion symptoms, decrease screen time, over-the-counter Tylenol or ibuprofen as needed and outpatient follow-up with concussion clinic as needed. Patient is non-toxic, not hypoxic and appropriate for this outpatient management plan which they prefer. Encouragement to arrange close follow up was discussed as well as provided in a written handout of discharge instructions. Patient was educated on signs of symptoms to watch for indicative of re-evaluation in the emergency department setting to include any worsening of current symptoms. They verbalized understanding of instructions and is amenable to this treatment plan with no social determinants of health that would obscure this plan. Patient departed in stable condition.            Your medication list      as of March  11, 2024  1:08 AM     You have not been prescribed any medications.         Procedure  None     *This report was transcribed using voice recognition software.  Every effort was made to ensure accuracy; however, inadvertent computerized transcription errors may be present.*  WENDI Clifton-ROSANA  03/11/24         WENDI Clifton-ROSANA  03/11/24 0117

## 2025-01-27 ENCOUNTER — HOSPITAL ENCOUNTER (OUTPATIENT)
Age: 40
Discharge: HOME | End: 2025-01-27
Payer: COMMERCIAL

## 2025-01-27 VITALS
WEIGHT: 118 LBS | HEART RATE: 84 BPM | TEMPERATURE: 99 F | SYSTOLIC BLOOD PRESSURE: 107 MMHG | BODY MASS INDEX: 21.71 KG/M2 | OXYGEN SATURATION: 99 % | RESPIRATION RATE: 16 BRPM | HEIGHT: 62 IN | DIASTOLIC BLOOD PRESSURE: 82 MMHG

## 2025-01-27 DIAGNOSIS — U07.1 COVID: Primary | ICD-10-CM

## 2025-01-27 LAB
POCT INFLUENZA A: NEGATIVE
POCT INFLUENZA B: NEGATIVE
SARS-COV-2 RNA RESP QL NAA+PROBE: DETECTED

## 2025-01-27 PROCEDURE — 99214 OFFICE O/P EST MOD 30 MIN: CPT

## 2025-01-27 PROCEDURE — 99213 OFFICE O/P EST LOW 20 MIN: CPT

## 2025-01-27 PROCEDURE — 87502 INFLUENZA DNA AMP PROBE: CPT | Performed by: EMERGENCY MEDICINE

## 2025-01-27 RX ORDER — ALBUTEROL SULFATE 90 UG/1
2 INHALANT RESPIRATORY (INHALATION) EVERY 4 HOURS PRN
Qty: 1 EACH | Refills: 0 | Status: SHIPPED | OUTPATIENT
Start: 2025-01-27 | End: 2025-02-26

## 2025-01-27 RX ORDER — BENZONATATE 200 MG/1
200 CAPSULE ORAL 3 TIMES DAILY PRN
Qty: 15 CAPSULE | Refills: 0 | Status: SHIPPED | OUTPATIENT
Start: 2025-01-27

## 2025-01-27 NOTE — ED PROVIDER NOTES
Patient Seen in: [facility]      History     Chief Complaint   Patient presents with    Cough     Stated Complaint: Cold - Coughing, sneezing, lots of mucus, sore throat and a lot of sinus pain    Subjective:   HPI    40 YO female with below stated past medical history presents to immediate care with her  for evaluation of cough, nasal congestion, throat irritation, sinus pressure, mild nausea starting 3 days ago. Fever the first few days of symptoms. No fever today. She reports history of bronchitis and has concern symptoms will turn into this.  She is requesting albuterol inhaler refill.      Objective:     Past Medical History:    Fibromyalgia    Migraines    PTSD (post-traumatic stress disorder)    Ulcerative colitis, chronic (HCC)              Past Surgical History:   Procedure Laterality Date    Cholecystectomy  2006                Social History     Socioeconomic History    Marital status:    Tobacco Use    Smoking status: Never    Smokeless tobacco: Never   Vaping Use    Vaping status: Never Used   Substance and Sexual Activity    Alcohol use: Yes     Alcohol/week: 1.0 standard drink of alcohol     Types: 1 Glasses of wine per week     Comment: 1-3 drinks a month    Drug use: Never    Sexual activity: Yes     Partners: Male   Other Topics Concern    Caffeine Concern Yes    Stress Concern Yes    Exercise Yes    Seat Belt Yes     Social Drivers of Health     Food Insecurity: Low Risk  (10/3/2022)    Received from [facility]    Food Insecurity     Have there been times that your food ran out, and you didn't have money to get more?: No     Are there times that you worry that this might happen?: No   Transportation Needs: Low Risk  (10/3/2022)    Received from [facility]    Transportation Needs     Do you have trouble getting transportation to medical appointments?: No    Housing Stability: Low Risk  (10/3/2022)    [facility]    Housing Stability     Are you concerned about having a safe and reliable place to live?: No              Review of Systems    Positive for stated complaint: Cold - Coughing, sneezing, lots of mucus, sore throat and a lot of sinus pain  Other systems are as noted in HPI.  Constitutional and vital signs reviewed.      All other systems reviewed and negative except as noted above.    Physical Exam     ED Triage Vitals [01/27/25 1301]   /82   Pulse 84   Resp 16   Temp 98.6 °F (37 °C)   Temp src Oral   SpO2 99 %   O2 Device None (Room air)       Current Vitals:   Vital Signs  BP: 107/82  Pulse: 84  Resp: 16  Temp: 98.6 °F (37 °C)  Temp src: Oral    Oxygen Therapy  SpO2: 99 %  O2 Device: None (Room air)        Physical Exam  Vitals and nursing note reviewed.   Constitutional:       General: She is not in acute distress.     Appearance: Normal appearance. She is not ill-appearing, toxic-appearing or diaphoretic.   Cardiovascular:      Rate and Rhythm: Normal rate.      Heart sounds: Normal heart sounds.   Pulmonary:      Effort: Pulmonary effort is normal. No respiratory distress.      Breath sounds: Normal breath sounds. No wheezing.   Neurological:      Mental Status: She is alert and oriented to person, place, and time.   Psychiatric:         Behavior: Behavior normal.         ED Course     Labs Reviewed   RAPID SARS-COV-2 BY PCR - Abnormal; Notable for the following components:       Result Value    Rapid SARS-CoV-2 by PCR Detected (*)     All other components within normal limits   POCT FLU TEST - Normal    Narrative:     This assay is a rapid molecular in vitro test utilizing nucleic acid amplification of influenza A and B viral RNA.       MDM      Differential diagnosis considered but not limited to influenza, COVID, bronchitis, pneumonia    Patient is afebrile and nontoxic in  appearance.  Physical exam is reassuring.  COVID-positive.  Influenza negative.  Supportive care discussed.  Tessalon Perles as needed for cough.  Albuterol inhaler refill prescribed, per patient's request.  Well-appearing at discharge.     Medical Decision Making  Amount and/or Complexity of Data Reviewed  Labs: ordered. Decision-making details documented in ED Course.    Risk  Prescription drug management.        Disposition and Plan     Clinical Impression:  1. COVID         Disposition:  Discharge  1/27/2025  1:47 pm    Follow-up:  [facility]  [address]  [facility] [id number] [phone]    If symptoms worsen          Medications Prescribed:  Current Discharge Medication List        START taking these medications    Details   albuterol 108 (90 Base) MCG/ACT Inhalation Aero Soln Inhale 2 puffs into the lungs every 4 (four) hours as needed for Wheezing or Shortness of Breath.  Qty: 1 each, Refills: 0      benzonatate 200 MG Oral Cap Take 1 capsule (200 mg total) by mouth 3 (three) times daily as needed for cough.  Qty: 15 capsule, Refills: 0                 Supplementary Documentation:

## 2025-01-27 NOTE — ED INITIAL ASSESSMENT (HPI)
Pt here c/o cough, congestion, sore throat, sinus pain/pressure since Friday.   Pressure behind both eyes, feels eyes are blurry.   Denies vomiting.  Slight nausea.      Denies sob or chest pain.   Burning to chest when coughing.

## 2025-03-19 ENCOUNTER — OFFICE VISIT (OUTPATIENT)
Dept: OBGYN CLINIC | Facility: CLINIC | Age: 40
End: 2025-03-19
Payer: COMMERCIAL

## 2025-03-19 VITALS
DIASTOLIC BLOOD PRESSURE: 54 MMHG | WEIGHT: 122.5 LBS | SYSTOLIC BLOOD PRESSURE: 98 MMHG | HEIGHT: 62 IN | BODY MASS INDEX: 22.54 KG/M2 | HEART RATE: 82 BPM

## 2025-03-19 DIAGNOSIS — Z01.419 WELL WOMAN EXAM WITH ROUTINE GYNECOLOGICAL EXAM: ICD-10-CM

## 2025-03-19 DIAGNOSIS — N94.3 PMS (PREMENSTRUAL SYNDROME): ICD-10-CM

## 2025-03-19 DIAGNOSIS — N94.6 DYSMENORRHEA: ICD-10-CM

## 2025-03-19 DIAGNOSIS — N92.0 MENORRHAGIA WITH REGULAR CYCLE: Primary | ICD-10-CM

## 2025-03-19 DIAGNOSIS — N64.3 GALACTORRHEA: ICD-10-CM

## 2025-03-19 DIAGNOSIS — Z12.4 CERVICAL CANCER SCREENING: ICD-10-CM

## 2025-03-19 PROCEDURE — 99459 PELVIC EXAMINATION: CPT | Performed by: NURSE PRACTITIONER

## 2025-03-19 PROCEDURE — 3074F SYST BP LT 130 MM HG: CPT | Performed by: NURSE PRACTITIONER

## 2025-03-19 PROCEDURE — 3008F BODY MASS INDEX DOCD: CPT | Performed by: NURSE PRACTITIONER

## 2025-03-19 PROCEDURE — 3078F DIAST BP <80 MM HG: CPT | Performed by: NURSE PRACTITIONER

## 2025-03-19 PROCEDURE — 99213 OFFICE O/P EST LOW 20 MIN: CPT | Performed by: NURSE PRACTITIONER

## 2025-03-19 PROCEDURE — 87624 HPV HI-RISK TYP POOLED RSLT: CPT | Performed by: NURSE PRACTITIONER

## 2025-03-19 PROCEDURE — 99395 PREV VISIT EST AGE 18-39: CPT | Performed by: NURSE PRACTITIONER

## 2025-03-19 NOTE — PROGRESS NOTES
Here for Routine Annual Exam    She still notes increased breast pain and mood changes prior to her menses for a full week. She also notes nipple discharge as well. This has been ongoing about 1+ year. She did try the progesterone only pill but stopped after 1 pack fu to significant mood changes. She tends to be very sensitive to side effects.    The last few cycles she has had heavy and painful menses with clots.    Her menses are monthly, the last 2-3 seem to be coming later that her typical up to 3-5 days.    Contraception- not using birth control.    ROS: No Cardiac, Respiratory, GI,  or Neurological symptoms.    PE:  GENERAL: well developed, well nourished, in no apparent distress  SKIN: no rashes, no suspicious lesions  HEENT: normal  NECK: supple; no thyroidmegaly, no adenopathy  LUNGS: clear to auscultation  CARDIOVASCULAR: normal S1, S2, RRR  BREASTS: firm, nontendder, no palpable masses or nodes, no nipple discharge, no skin changes, no axillary adenopathy,    ABDOMEN: Soft, non distended; non tender, no masses  GYNE/: External Genitalia: Normal without lesoins                       Vagina: normal without lesions                     Uterus: mid, mobile, non tender, normal size                     Cervix: no lesions or CMT                     Adnexa: non tender, no masses, normal size  EXTREMITIES:  non tender without edema    A/P:   1. Well woman exam with routine gynecological exam    2. Dysmenorrhea  - US PELVIS W EV (CPT=76856/79372); Future    3. Galactorrhea  12 hour fast with no biotin x 3 days  - Prolactin; Future  - TSH W Reflex To Free T4; Future  - [facility] IKE 2D+3D DIAGNOSTIC [facility]  BILAT (CPT=77066/34249); Future    4. PMS (premenstrual syndrome)  She would like to avoid any medications or hormones. She would like a more natural approach. Discussed consider seeing an integrative medicine provider. She can also support her hormones with nutrients and supplements.    5. Menorrhagia with  regular cycle  - US PELVIS W EV (CPT=76856/81492); Future    6. Cervical cancer screening  - ThinPrep PAP with HPV Reflex Request; Future  - ThinPrep PAP with HPV Reflex Request    An additional 25 minutes was spent discussing her concerns outside her routine annual exam     Return to clinic 1 year for routine exam, or as needed with any concerns or question

## 2025-03-21 ENCOUNTER — OFFICE VISIT (OUTPATIENT)
Dept: FAMILY MEDICINE CLINIC | Facility: CLINIC | Age: 40
End: 2025-03-21
Payer: COMMERCIAL

## 2025-03-21 VITALS
DIASTOLIC BLOOD PRESSURE: 60 MMHG | SYSTOLIC BLOOD PRESSURE: 110 MMHG | OXYGEN SATURATION: 98 % | HEART RATE: 62 BPM | TEMPERATURE: 97 F | RESPIRATION RATE: 16 BRPM | BODY MASS INDEX: 22.68 KG/M2 | WEIGHT: 123.25 LBS | HEIGHT: 62 IN

## 2025-03-21 DIAGNOSIS — F41.9 ANXIETY AND DEPRESSION: ICD-10-CM

## 2025-03-21 DIAGNOSIS — Z13.220 SCREENING CHOLESTEROL LEVEL: ICD-10-CM

## 2025-03-21 DIAGNOSIS — F43.10 PTSD (POST-TRAUMATIC STRESS DISORDER): ICD-10-CM

## 2025-03-21 DIAGNOSIS — F32.A ANXIETY AND DEPRESSION: ICD-10-CM

## 2025-03-21 DIAGNOSIS — Z00.00 WELL ADULT EXAM: Primary | ICD-10-CM

## 2025-03-21 DIAGNOSIS — F99 INSOMNIA DUE TO OTHER MENTAL DISORDER: ICD-10-CM

## 2025-03-21 DIAGNOSIS — L65.9 HAIR LOSS: ICD-10-CM

## 2025-03-21 DIAGNOSIS — M79.7 FIBROMYALGIA: ICD-10-CM

## 2025-03-21 DIAGNOSIS — F51.05 INSOMNIA DUE TO OTHER MENTAL DISORDER: ICD-10-CM

## 2025-03-21 LAB — HPV E6+E7 MRNA CVX QL NAA+PROBE: NEGATIVE

## 2025-03-21 RX ORDER — TOFACITINIB 10 MG/1
TABLET, FILM COATED ORAL
COMMUNITY
Start: 2025-03-19

## 2025-03-21 RX ORDER — GABAPENTIN 600 MG/1
600 TABLET ORAL 3 TIMES DAILY
Qty: 270 TABLET | Refills: 0 | Status: SHIPPED | OUTPATIENT
Start: 2025-03-21

## 2025-03-21 RX ORDER — GABAPENTIN 100 MG/1
100 CAPSULE ORAL 3 TIMES DAILY
Qty: 90 CAPSULE | Refills: 1 | Status: SHIPPED | OUTPATIENT
Start: 2025-03-21

## 2025-03-21 RX ORDER — BUSPIRONE HYDROCHLORIDE 5 MG/1
5 TABLET ORAL 2 TIMES DAILY
Qty: 60 TABLET | Refills: 0 | Status: SHIPPED | OUTPATIENT
Start: 2025-03-21

## 2025-03-21 NOTE — PROGRESS NOTES
Subjective:      Chief Complaint   Patient presents with    Physical    Sleep Problem     Difficulty sleeping    Stress     Difficulty with stress and anxiety - states already dong therapy but is not working - PHQ2: 0, GAD7: 18     HISTORY OF PRESENT ILLNESS  HPI  HPI obtained per patient report.  Georgette Olguin is a pleasant 39 year old female presenting for a physical and follow-up.   She continues to struggle with anxiety and fibromyalgia symptoms. She follows up with her counselor but her symptoms still affect her significantly daily. Her anxiety symptoms include difficulty staying asleep and excessive worrying and stress.  She saw her gynecologist recently for her pap smear. She ordered a TSH for evaluation of pt's hair thinning complaint.     PAST PATIENT HISTORY  Past Medical History:    Anxiety    Fibromyalgia    Migraines    PTSD (post-traumatic stress disorder)    Ulcerative colitis, chronic (HCC)     Past Surgical History:   Procedure Laterality Date    Cholecystectomy  2006       CURRENT MEDICATIONS  Medications Taking[1]    HEALTH MAINTENANCE  Immunization History   Administered Date(s) Administered    >=3 YRS TRI  MULTIDOSE VIAL (35758) FLU CLINIC 10/08/2008    Covid-19 Vaccine Moderna 100 mcg/0.5 ml 03/22/2021, 04/19/2021, 05/30/2022    Covid-19 Vaccine Moderna 50 Mcg/0.25 Ml 11/30/2021    Covid-19 Vaccine Moderna Bivalent 50mcg/0.5mL 12+ years 12/05/2022    FLUZONE 6 months and older PFS 0.5 ml (84054) 10/02/2018, 12/03/2019, 10/03/2022    Influenza 10/02/2018    Pneumococcal (Prevnar 13) 10/02/2018    Pneumovax 23 07/09/2019    TDAP 07/09/2019       ALLERGIES AND DRUG REACTIONS  Allergies[2]    Family History   Problem Relation Age of Onset    Depression Mother     Diabetes Mother     Diabetes Maternal Grandfather     Stroke Maternal Grandfather     Diabetes Maternal Grandmother      Social History     Socioeconomic History    Marital status:    Tobacco Use    Smoking status: Never     Smokeless tobacco: Never   Vaping Use    Vaping status: Never Used   Substance and Sexual Activity    Alcohol use: Yes     Alcohol/week: 1.0 standard drink of alcohol     Types: 1 Glasses of wine per week     Comment: 1-3 drinks a month    Drug use: Never    Sexual activity: Yes     Partners: Male   Other Topics Concern    Caffeine Concern Yes    Stress Concern Yes    Exercise Yes    Seat Belt Yes     Social Drivers of Health     Food Insecurity: No Food Insecurity (3/19/2025)    NCSS - Food Insecurity     Worried About Running Out of Food in the Last Year: No     Ran Out of Food in the Last Year: No   Transportation Needs: No Transportation Needs (3/19/2025)    NCSS - Transportation     Lack of Transportation: No   Housing Stability: Not At Risk (3/19/2025)    NCSS - Housing/Utilities     Has Housing: Yes     Worried About Losing Housing: No     Unable to Get Utilities: No       Review of Systems   Constitutional:  Positive for fatigue.   Musculoskeletal:  Positive for arthralgias and myalgias.   Psychiatric/Behavioral:  Positive for sleep disturbance. The patient is nervous/anxious.    All other systems reviewed and are negative.         Objective:      /60   Pulse 62   Temp 97.3 °F (36.3 °C) (Temporal)   Resp 16   Ht 5' 2\" (1.575 m)   Wt 123 lb 4 oz (55.9 kg)   LMP 03/10/2025 (Approximate)   SpO2 98%   BMI 22.54 kg/m²   Body mass index is 22.54 kg/m².    Physical Exam  Vitals reviewed.   Constitutional:       General: She is not in acute distress.     Appearance: She is not ill-appearing, toxic-appearing or diaphoretic.   HENT:      Head: Normocephalic and atraumatic.   Eyes:      General: No scleral icterus.        Right eye: No discharge.         Left eye: No discharge.      Extraocular Movements: Extraocular movements intact.      Conjunctiva/sclera: Conjunctivae normal.   Neck:      Thyroid: No thyroid mass, thyromegaly or thyroid tenderness.   Cardiovascular:      Rate and Rhythm: Normal rate  and regular rhythm.      Heart sounds: Normal heart sounds.   Pulmonary:      Effort: Pulmonary effort is normal.      Breath sounds: Normal breath sounds.   Abdominal:      General: Abdomen is flat. Bowel sounds are normal. There is no distension.      Palpations: Abdomen is soft. There is no mass.      Tenderness: There is no abdominal tenderness. There is no guarding or rebound.      Hernia: No hernia is present.   Musculoskeletal:      Cervical back: Neck supple. No tenderness.      Right lower leg: No edema.      Left lower leg: No edema.   Lymphadenopathy:      Cervical: No cervical adenopathy.   Skin:     Findings: No rash.   Neurological:      Mental Status: She is alert and oriented to person, place, and time.            Assessment and Plan:      1. Well adult exam (Primary)  -     CBC With Differential With Platelet; Future; Expected date: 03/21/2025  -     Comp Metabolic Panel (14); Future; Expected date: 03/21/2025  -     Lipid Panel; Future; Expected date: 03/21/2025  2. Screening cholesterol level  -     Lipid Panel; Future; Expected date: 03/21/2025  3. Hair loss  -     Vitamin D; Future; Expected date: 03/21/2025  4. Anxiety and depression  -     [facility] - INTERNAL  -     busPIRone HCl; Take 1 tablet (5 mg total) by mouth in the morning and 1 tablet (5 mg total) before bedtime.  Dispense: 60 tablet; Refill: 0  5. Fibromyalgia  -     Gabapentin; Take 1 tablet (600 mg total) by mouth 3 (three) times daily.  Dispense: 270 tablet; Refill: 0  -     Gabapentin; Take 1 capsule (100 mg total) by mouth 3 (three) times daily.  Dispense: 90 capsule; Refill: 1  -     [facility] - INTERNAL  6. PTSD (post-traumatic stress disorder)  -     [facility] - INTERNAL  7. Insomnia due to other mental disorder  -     [facility] INTERNAL  -     busPIRone HCl; Take 1 tablet (5 mg total) by mouth in the morning and 1 tablet (5 mg total) before bedtime.   Dispense: 60 tablet; Refill: 0    Return in about 6 months (around 9/21/2025).    Physical  - annual lab orders discussed and provided, including addition of vitamin D to further evaluate her hair loss symptoms  - pap smear UTD per her Gyne     Anxiety, history of depression, PTSD, fibromyalgia, insomnia  - she denies depression symptoms currently  - she is following up regularly with her counselor  - symptoms inadequately controlled by her current regimen of gabapentin 600 mg twice daily. She has tried TID without improvement in her symptoms  - we discussed treatment options to help improve her symptoms, including cymbalta, amitriptyline, buspirone, lyrica, or an increased dose of gabapentin  - she is concerned about the potential side effects of these options but is open to trying an increased dose of gabapentin or adding on buspirone. She is uncertain which of these two adjustments she would like to start with. We discussed that she may increase her gabapentin dose by 100  mg BID every 2 weeks as needed for symptom control, up to 1200 mg TID. Alternatively, she can add on a low dose of buspirone as prescribed to her current regimen. We discussed that the potential side effects of these adjustments include possible fatigue and drowsiness  - she is also open to considering consulting with a psychiatrist. [facility] referral information was provided for her today     Patient verbalized understanding of assessment and recommendations. All questions and concerns were addressed.    Electronically signed by Viktoriya Quinteros MD         [1]   Outpatient Medications Marked as Taking for the 3/21/25 encounter (Office Visit) with Viktoriya Quinteros MD   Medication Sig Dispense Refill    gabapentin 600 MG Oral Tab Take 1 tablet (600 mg total) by mouth 3 (three) times daily. 270 tablet 0    XELJANZ 10 MG Oral Tab       gabapentin 100 MG Oral Cap Take 1 capsule (100 mg total) by mouth 3 (three) times daily. 90 capsule 1    busPIRone 5 MG Oral Tab  Take 1 tablet (5 mg total) by mouth in the morning and 1 tablet (5 mg total) before bedtime. 60 tablet 0    Multiple Vitamins-Minerals (MULTIVIT/MULTIMINERAL ADULT OR) Take by mouth As Directed.      acetaminophen 500 MG Oral Tab Take 2 tablets (1,000 mg total) by mouth every 6 (six) hours as needed.     [2]   Allergies  Allergen Reactions    Mesalamine HIVES, ITCHING and RASH     Rash all over face and body

## 2025-03-26 ENCOUNTER — TELEPHONE (OUTPATIENT)
Dept: OBGYN CLINIC | Facility: CLINIC | Age: 40
End: 2025-03-26

## 2025-03-26 ENCOUNTER — TELEPHONE (OUTPATIENT)
Age: 40
End: 2025-03-26

## 2025-03-26 NOTE — TELEPHONE ENCOUNTER
Patient notified of results and recommendations.  Patient denies symptoms of BV at this time.  Patient to call back if symptoms present of thin watery discharge, vaginal discomfort or fishy vaginal odor.  Patient verbalized understanding.

## 2025-03-26 NOTE — TELEPHONE ENCOUNTER
Norma Robbins, LAURELN  3/25/2025 10:17 AM CDT Back to Top      Normal pap and HPV, routine follow up 1 year/ prn  BV noted, OK to treat with symptoms just let me know.

## 2025-04-06 ENCOUNTER — HOSPITAL ENCOUNTER (OUTPATIENT)
Dept: ULTRASOUND IMAGING | Age: 40
Discharge: HOME | End: 2025-04-06
Attending: NURSE PRACTITIONER
Payer: COMMERCIAL

## 2025-04-06 ENCOUNTER — HOSPITAL ENCOUNTER (OUTPATIENT)
Dept: ULTRASOUND IMAGING | Age: 40
End: 2025-04-06
Attending: NURSE PRACTITIONER
Payer: COMMERCIAL

## 2025-04-06 DIAGNOSIS — N92.0 MENORRHAGIA WITH REGULAR CYCLE: ICD-10-CM

## 2025-04-06 DIAGNOSIS — N94.6 DYSMENORRHEA: ICD-10-CM

## 2025-04-06 PROCEDURE — 76856 US EXAM PELVIC COMPLETE: CPT | Performed by: NURSE PRACTITIONER

## 2025-04-06 PROCEDURE — 76830 TRANSVAGINAL US NON-OB: CPT | Performed by: NURSE PRACTITIONER

## 2025-04-08 ENCOUNTER — TELEPHONE (OUTPATIENT)
Dept: OBGYN CLINIC | Facility: CLINIC | Age: 40
End: 2025-04-08

## 2025-04-08 NOTE — PROGRESS NOTES
Called and spoke with pt.,reviewed test results and recommendations from Zenia and pt. Does agree with plan, verbalizes understanding of information.

## 2025-04-08 NOTE — TELEPHONE ENCOUNTER
"    4/16/2024     9:45 AM   Post Discharge Outreach   Admission Date 4/10/2024   Reason for Admission weakness, anorexia, worsening confusion, falls.   Discharge Date 4/12/2024   Discharge Diagnosis new pancreatic mass, hypcalcemia, DARLENE, cognitive impairment, general weakness and fall   How are you doing now that you are home? Patient's daughter said, \"She is ok\".   How are your symptoms? (Red Flag symptoms escalate to triage hotline per guidelines) Improved   Do you feel your condition is stable enough to be safe at home until your provider visit? Yes   Does the patient have their discharge instructions?  Yes   Does the patient have questions regarding their discharge instructions?  No   Were you started on any new medications or were there changes to any of your previous medications?  Yes   Does the patient have all of their medications? Yes   Do you have questions regarding any of your medications?  No   Do you have all of your needed medical supplies or equipment (DME)?  (i.e. oxygen tank, CPAP, cane, etc.) Yes   Discharge follow-up appointment scheduled within 14 calendar days?  Yes   Discharge Follow Up Appointment Date 4/16/2024   Discharge Follow Up Appointment Scheduled with? Primary Care Provider     Hospital Follow-up Visit:    Hospital/Nursing Home/IP Rehab Facility: [facility]  Date of Admission: 4/10/24  Date of Discharge: 4/12/24  Reason(s) for Admission: fall  Was the patient in the ICU or did the patient experience delirium during hospitalization?  No  Do you have any other stressors you would like to discuss with your provider? No    Problems taking medications regularly:  None  Medication changes since discharge: None  Problems adhering to non-medication therapy:  None    Summary of hospitalization:  [facility] discharge summary reviewed      History of Present Illness  Bibiana Mcrae is an 78 year old female who presented with weakness, anorexia, " Patient is returning missed call from the nurse. She will be available to please call back   worsening confusion and falls     Hospital Course  78F cognitive disorder, HTN, anxiety who presents with several days of weakness, anorexia, worsening confusion and falls related to generalized weakness. CT showing pancreatic and peripancreatic mass and hypercalcemia.      Pancreatic and peripancreatic mass  -IR for CT guided biopsy. Holding Lovenox; NPO   -concerning for cancer; appreciate oncologist consultation.  -Patient did eat breakfast and thus IR cannot do biopsy today and won't do biopsy over weekend since it is not emergent.  -pt and family decided not to wait for the biopsy (which would be next week) and would like to go home today and arrange for outpt biosy  -instructed to follow up closely with pcp, oncologist and IR (biopsy asap)     #DARLENE - improving with IVF.       #Hypercalcemia - likely from dehydration but also possible due to pancreatic mass (malignant?)  -vitamin D slightly elevated.    -Stop os-rahat for now.    -if getting denosumab may in future need to resume supplementation. Defer to PCP.     #Cognitive impairment - awaiting formal neuro evaluation outpatient.  Recent SLUMS of 17/30 and atrophy on CT brain.  Likely dementia  -TSH and B12 WNL  -aricept     #Weakness/Falls - due to above  -monitor on tele given daughter report of bradycardia  -PT/OT.  Recommended home health care, pt, ot.       #HTN - change atenolol to norvasc to due bradycardia.     #Anxiety - continue pamelor, buspar, seroquel HS PRN.     #Insomnia - resume pamelor, melatonin PRN     #post prandial hyperglycemia - acarbose     Mild hypokalemia  -k 3.4; due to poor oral intake  -supplement for 5 days     Sarah Chris MD      Diagnostic Tests/Treatments reviewed.  Follow up needed: none  Other Healthcare Providers Involved in Patient s Care:         None  Update since discharge: stable.       CT pancreas percutaneous biopsy EXAM on 4/15/24:  1. PERCUTANEOUS BIOPSY ABDOMINAL MASS  2. CT GUIDANCE  3. CONSCIOUS SEDATION      LOCATION: [facility]  DATE: 4/15/2024     INDICATION: Abdominal mass.  TECHNIQUE: Dose reduction techniques were used.     PROCEDURE: Informed consent obtained. Site marked. Prior images reviewed. Required items made available. Patient identity confirmed verbally and with arm band. Patient reevaluated immediately before administering sedation. Universal protocol was   followed. Time out performed. The site was prepped and draped in sterile fashion. 10 mL of 1% lidocaine was infused into the local soft tissues. Using standard technique and under direct CT guidance, a 20-gauge biopsy device was used to obtain five core   biopsies. Tissue was submitted to Pathology and was adequate by preliminary review by a pathologist.     The patient tolerated the procedure well. No immediate complications.     SEDATION: Versed 0.25 mg. Fentanyl 25 mcg. The procedure was performed with administration intravenous conscious sedation with appropriate preoperative, intraoperative, and postoperative evaluation.     21 minutes of supervised face to face conscious sedation time was provided by a radiology nurse under my direct supervision.                                                                      IMPRESSION:  1.  Successful CT-guided biopsy left abdominal mass.     Reference CPT Codes: 31421, 27010, 15172        EXAM: CT ABDOMEN PELVIS W CONTRAST  LOCATION: [facility]  DATE: 4/11/2024     INDICATION: evaluate peripancreatic soft tissue mass seen on CT lumbar spine  COMPARISON: CT spine 4/10/2024  TECHNIQUE: CT scan of the abdomen and pelvis was performed following injection of IV contrast. Multiplanar reformats were obtained. Dose reduction techniques were used.  CONTRAST: isovue 370 42ml     FINDINGS:   LOWER CHEST: Partially imaged coronary artery calcifications.     HEPATOBILIARY: Cholecystectomy.     PANCREAS: The parenchyma is atrophic and there is mild main duct dilation up to  0.5 cm in the head. In the distal body and tail, there are multilobulated soft tissue masses measuring 4.9 x 4.4 cm (3/51) and 6.7 x 5.2 cm (3/66).     SPLEEN: Normal.     ADRENAL GLANDS: Normal.     KIDNEYS/BLADDER: Severely dilated left ureter with probable ureterocele and abnormal inferior insertion. Right kidney is normal.     BOWEL: The pancreatic/peripancreatic soft tissue masses have broad contact with the lesser curvature of the stomach near a partial gastrectomy resection margin. The distal stomach is narrowed and compressed, but there is no upstream dilation. Colonic   diverticulosis, but no acute inflammation or obstruction.     LYMPH NODES: Normal.     VASCULATURE: Normal.     PELVIC ORGANS: Normal.     MUSCULOSKELETAL: Multilevel degenerative changes with transitional lumbosacral anatomy. Anterolisthesis of L3 on L4 and L4 on L5.                                                                       IMPRESSION:   1.  Multilobulated soft tissue masses in the pancreatic/peripancreatic region. Primary considerations include pancreatic ductal adenocarcinoma, gastrointestinal stromal tumor (arising from the adjacent stomach), or malignant periceliac lymph nodes.   Suggest tissue sampling.     2.  Severely dilated left ureter with morphology suggesting duplicated collecting system. 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           HPI:    Transitional care management completed.  Medication reconciliation performed.  Patient with underlying history of weakness with recent falls and was seen through emergency department subsequent hospitalization with noted pancreatic mass concerning for potential cancer.  Elected to be discharged home with outpatient interventional radiology for CT-guided biopsy to be completed.  This was performed April 15, 2024 with biopsy results pending.  Has follow-up with Dr. Florian next Wednesday, April 24, 2024 for further  assessment.  Underlying cognitive impairment with slums score 17 out of 30 previously and will see Dr. Mann Friday, April 19, 2024.  Patient was evaluated for hypercalcemia, mild.  Utilizing 2 wheeled walker currently for falls risk prevention.  Had switched from atenolol to amlodipine 5 mg daily for hypertension management while in the hospital.  Mild associated normochromic normocytic anemia with hemoglobin 11.5.  Improvement in acute renal insufficiency prior to discharge with creatinine 0.83 and GFR 72 with serum calcium 10.4.        A/P:    Pancreatic mass  Pancreatic mass with outpatient interventional radiology CT-guided biopsy performed yesterday.  Awaiting pathology results.  Will meet with Dr. Florian Wednesday, April 24, 2024 in order to review and determine further treatment plan.    Essential hypertension with goal blood pressure less than 130/80  Has switched from atenolol to amlodipine due to prior bradycardia concerns.  Blood pressure 128/70 on recheck.  Mildly tachycardic heart rate however and will reassess at follow-up next Thursday, April 25, 2024.  - Basic metabolic panel  - Basic metabolic panel    Hypercalcemia  Ionized calcium and base metabolic panel is pending.  - Ionized Calcium  - Basic metabolic panel  - Ionized Calcium  - Basic metabolic panel    Normochromic normocytic anemia  Normochromic normocytic anemia with hemoglobin stable at 11.5 and repeat MCV 96 today.  - CBC with platelets  - CBC with platelets         Plan of care communicated with patient and family

## 2025-04-28 ENCOUNTER — OFFICE VISIT (OUTPATIENT)
Dept: SURGERY | Facility: CLINIC | Age: 40
End: 2025-04-28
Payer: COMMERCIAL

## 2025-04-28 VITALS
BODY MASS INDEX: 22.93 KG/M2 | DIASTOLIC BLOOD PRESSURE: 62 MMHG | WEIGHT: 124.63 LBS | HEIGHT: 62 IN | SYSTOLIC BLOOD PRESSURE: 116 MMHG

## 2025-04-28 DIAGNOSIS — N94.3 PMS (PREMENSTRUAL SYNDROME): ICD-10-CM

## 2025-04-28 DIAGNOSIS — N92.1 MENOMETRORRHAGIA: Primary | ICD-10-CM

## 2025-04-28 DIAGNOSIS — N94.6 DYSMENORRHEA: ICD-10-CM

## 2025-04-28 RX ORDER — DULOXETIN HYDROCHLORIDE 20 MG/1
20 CAPSULE, DELAYED RELEASE ORAL DAILY
COMMUNITY
Start: 2025-04-19

## 2025-04-28 RX ORDER — TRANEXAMIC ACID 650 MG/1
TABLET ORAL
Qty: 30 TABLET | Refills: 3 | Status: SHIPPED | OUTPATIENT
Start: 2025-04-28

## 2025-04-28 NOTE — PROGRESS NOTES
NEW GYN H&P     2025  11:33 AM    Chief Complaint   Patient presents with    Menstrual Problem     C/o heavy periods, irregular, painful , acne , hair loss   Discuss treatment options for symptoms   .    HPI: Patient is a 39 year old  LMP 25 here to establish care and consult about management options for heavy periods and PMS which activates UC symptoms. Had USN 2025 showing 8.9 cm uterus with a 1.5 cm intramural fibroid and a thickened EM lining measuring 11 mm. Prefers holistic treatment approaches. Discussed beginning \"Vital Nutrients\" PMS support supplement containing Vitex Chasteberry root and consulting with [facility] for additional management strategies for PMS. Also discussed micro-dosing of Zoloft as additional future option if no improvement in symptoms. In terms of UC flares, recommended gut health evaluation with Integrative Medicine. For menstrual cycles, recommended trial with oral TXA as needed to manage episodic heavy flow and possible Mirena IUD if no improvement. No other gynecologic concerns or complaints.    Patient's last menstrual period was 2025.    OB History    Para Term  AB Living   2 2 2   2   SAB IAB Ectopic Multiple Live Births       2      # Outcome Date GA Lbr Bridger/2nd Weight Sex Type Anes PTL Lv   2 Term 10/2008 38w0d   F Vag-Spont EPI N SHANE   1 Term 2002 38w0d  6 lb 5 oz (2.863 kg) F Vag-Spont EPI N SHANE       GYN hx:    Hx Prior Abnormal Pap: Yes  Pap Date: 25 (pt reports 2018)  Pap Result Notes: neg  CONTRACEPTION: None  LAST MAMMOGRAM: None      Current Medications[1]    Past Medical History[2]  Past Surgical History[3]  Allergies[4]  Family History[5]  Set as collapsible by default.[6]  Social History     Social History Narrative    Not on file       ROS:     Review of Systems:  A comprehensive 10 point ROS was completed. All pertinent positives and negatives noted in the HPI.     /62   Ht 62\"   Wt 124 lb 9.6 oz (56.5 kg)   LMP  04/08/2025   BMI 22.79 kg/m²       A/P: Patient is 39 year old female     1. Menometrorrhagia    2. Fibroid uterus with thickened EM lining  - TXA prescribed for heavy episodic bleeding  - Consider long-acting progesterone IUD if no improvement    3. PMS causing Ulcerative Colitis flares  - Start \"Vital Nutrients\" PMS support with Vitex Chasteberry twice daily  - Consider micro-dosing Zoloft if no improvement  - Referral for Reiki placed  - Referral give for Integrative Medicine consultation with Dr. Pereyra to assess gut health      Total time spent = 45 minutes  50% visit = face to face counseling and coordination of care        4/28/2025  Ailyn Saini MD                    [1]   Current Outpatient Medications   Medication Sig Dispense Refill    DULoxetine 20 MG Oral Cap DR Particles Take 1 capsule (20 mg total) by mouth daily.      gabapentin 600 MG Oral Tab Take 1 tablet (600 mg total) by mouth 3 (three) times daily. 270 tablet 0    XELJANZ 10 MG Oral Tab       gabapentin 100 MG Oral Cap Take 1 capsule (100 mg total) by mouth 3 (three) times daily. 90 capsule 1    Multiple Vitamins-Minerals (MULTIVIT/MULTIMINERAL ADULT OR) Take by mouth As Directed.      acetaminophen 500 MG Oral Tab Take 2 tablets (1,000 mg total) by mouth every 6 (six) hours as needed.      XELJANZ 5 MG Oral Tab TAKE 1 TABLET TWICE DAILY. MAY BE TAKEN WITH OR WITHOUT FOOD. STORE AT ROOM TEMPERATURE (Patient not taking: Reported on 3/21/2025)     [2]   Past Medical History:   Anxiety    Fibromyalgia    Migraines    PTSD (post-traumatic stress disorder)    Ulcerative colitis, chronic (HCC)   [3]   Past Surgical History:  Procedure Laterality Date    Cholecystectomy  2006   [4]   Allergies  Allergen Reactions    Mesalamine HIVES, ITCHING and RASH     Rash all over face and body   [5]   Family History  Problem Relation Age of Onset    Depression Mother     Diabetes Mother     Diabetes Maternal Grandfather     Stroke Maternal Grandfather      Diabetes Maternal Grandmother    [6]   Social History  Socioeconomic History    Marital status:    Tobacco Use    Smoking status: Never    Smokeless tobacco: Never   Vaping Use    Vaping status: Never Used   Substance and Sexual Activity    Alcohol use: Yes     Alcohol/week: 1.0 standard drink of alcohol     Types: 1 Glasses of wine per week     Comment: 1-3 drinks a month    Drug use: Never    Sexual activity: Yes     Partners: Male

## 2025-07-14 ENCOUNTER — HOSPITAL ENCOUNTER (OUTPATIENT)
Age: 40
Discharge: HOME | End: 2025-07-14
Payer: COMMERCIAL

## 2025-07-14 ENCOUNTER — APPOINTMENT (OUTPATIENT)
Dept: GENERAL RADIOLOGY | Age: 40
End: 2025-07-14
Attending: NURSE PRACTITIONER
Payer: COMMERCIAL

## 2025-07-14 VITALS
WEIGHT: 127.31 LBS | HEART RATE: 74 BPM | BODY MASS INDEX: 23.43 KG/M2 | RESPIRATION RATE: 20 BRPM | HEIGHT: 62 IN | TEMPERATURE: 98 F | OXYGEN SATURATION: 100 % | SYSTOLIC BLOOD PRESSURE: 115 MMHG | DIASTOLIC BLOOD PRESSURE: 75 MMHG

## 2025-07-14 DIAGNOSIS — S39.012A STRAIN OF LUMBAR REGION, INITIAL ENCOUNTER: Primary | ICD-10-CM

## 2025-07-14 LAB
B-HCG UR QL: NEGATIVE
BILIRUB UR QL STRIP: NEGATIVE
CLARITY UR: CLEAR
COLOR UR: YELLOW
GLUCOSE UR STRIP-MCNC: NEGATIVE MG/DL
HGB UR QL STRIP: NEGATIVE
KETONES UR STRIP-MCNC: NEGATIVE MG/DL
LEUKOCYTE ESTERASE UR QL STRIP: NEGATIVE
NITRITE UR QL STRIP: NEGATIVE
PH UR STRIP: 6 [PH]
PROT UR STRIP-MCNC: NEGATIVE MG/DL
SP GR UR STRIP: 1.01
UROBILINOGEN UR STRIP-ACNC: <2 MG/DL

## 2025-07-14 PROCEDURE — 81025 URINE PREGNANCY TEST: CPT

## 2025-07-14 PROCEDURE — 81002 URINALYSIS NONAUTO W/O SCOPE: CPT

## 2025-07-14 PROCEDURE — 99214 OFFICE O/P EST MOD 30 MIN: CPT

## 2025-07-14 PROCEDURE — 72100 X-RAY EXAM L-S SPINE 2/3 VWS: CPT | Performed by: NURSE PRACTITIONER

## 2025-07-14 PROCEDURE — 96372 THER/PROPH/DIAG INJ SC/IM: CPT

## 2025-07-14 RX ORDER — CYCLOBENZAPRINE HCL 10 MG
10 TABLET ORAL 3 TIMES DAILY PRN
Qty: 20 TABLET | Refills: 0 | Status: SHIPPED | OUTPATIENT
Start: 2025-07-14 | End: 2025-07-21

## 2025-07-14 RX ORDER — LIDOCAINE 50 MG/G
1 PATCH TOPICAL EVERY 24 HOURS
Qty: 5 PATCH | Refills: 0 | Status: SHIPPED | OUTPATIENT
Start: 2025-07-14 | End: 2025-07-19

## 2025-07-14 RX ORDER — KETOROLAC TROMETHAMINE 30 MG/ML
30 INJECTION, SOLUTION INTRAMUSCULAR; INTRAVENOUS ONCE
Status: COMPLETED | OUTPATIENT
Start: 2025-07-14 | End: 2025-07-14

## 2025-07-14 NOTE — ED PROVIDER NOTES
Patient Seen in: [facility]       The following individual(s) verbally consented to be recorded using ambient AI listening technology and understand that they can each withdraw their consent to this listening technology at any point by asking the clinician to turn off or pause the recording:    Patient name: Georgette Olguin  Additional names:  Peter      History  Chief Complaint   Patient presents with    Back Pain     Sciatica nerve pain - Entered by patient     Stated Complaint: Back Pain - Sciatica nerve pain    Subjective:   HPI     Georgette Olguin is a 39 year old female with ulcerative colitis and fibromyalgia who presents with acute lower back pain after a workout injury.    She experienced acute lower back pain following a workout on Saturday. During a barbell squat, she felt a 'pop' and a sharp, lightning bolt-like pain, which she associates with a possible form error. She had increased her weight by five pounds during this session. The pain is primarily located in the lower back and is exacerbated by movement, particularly when extending her back.    She has attempted self-care measures including icing the area multiple times a day and performing gentle stretches, such as pelvic tilts and knee-to-chest exercises. However, these measures have not significantly alleviated her pain. The pain is severe enough to impact her daily activities, initially improving with rest but returning upon resuming normal activities.    Her past medical history includes ulcerative colitis and fibromyalgia. She has previously used medications such as prednisone for UC flare-ups and tramadol for pain management related to both UC and fibromyalgia. She reports sensitivity to the side effects of steroids, including mood swings and hot flashes, and prefers to avoid them if possible. She has tolerated NSAIDs like Toradol in the past, despite her general avoidance of this medication class due to her UC.    In terms  of her current medication use, she has tried Tylenol without relief and has been relying on ice for symptom management. She is unable to take NSAIDs at home due to her medical history.    Severe lower back pain with movement. No recent fever or other systemic symptoms.        Objective:     Past Medical History:    Anxiety    Fibromyalgia    Migraines    PTSD (post-traumatic stress disorder)    Ulcerative colitis, chronic (HCC)              Past Surgical History:   Procedure Laterality Date    Cholecystectomy  2006                Social History     Socioeconomic History    Marital status:    Tobacco Use    Smoking status: Never    Smokeless tobacco: Never   Vaping Use    Vaping status: Never Used   Substance and Sexual Activity    Alcohol use: Yes     Alcohol/week: 1.0 standard drink of alcohol     Types: 1 Glasses of wine per week     Comment: 1-3 drinks a month    Drug use: Never    Sexual activity: Yes     Partners: Male   Other Topics Concern    Blood Transfusions No    Caffeine Concern Yes    Stress Concern Yes    Exercise Yes    Seat Belt Yes     Social Drivers of Health     Food Insecurity: No Food Insecurity (3/19/2025)    NCSS - Food Insecurity     Worried About Running Out of Food in the Last Year: No     Ran Out of Food in the Last Year: No   Transportation Needs: No Transportation Needs (3/19/2025)    NCSS - Transportation     Lack of Transportation: No   Housing Stability: Not At Risk (3/19/2025)    NCSS - Housing/Utilities     Has Housing: Yes     Worried About Losing Housing: No     Unable to Get Utilities: No              Review of Systems   Constitutional: Negative.    HENT: Negative.     Eyes: Negative.    Respiratory: Negative.     Cardiovascular: Negative.    Gastrointestinal: Negative.    Endocrine: Negative.    Genitourinary: Negative.    Musculoskeletal:  Positive for back pain.   Skin: Negative.    Allergic/Immunologic: Negative.    Neurological: Negative.    Hematological: Negative.     Psychiatric/Behavioral: Negative.         Positive for stated complaint: Back Pain - Sciatica nerve pain  Other systems are as noted in HPI.  Constitutional and vital signs reviewed.      All other systems reviewed and negative except as noted above.                  Physical Exam    ED Triage Vitals [07/14/25 1535]   /75   Pulse 74   Resp 20   Temp 98.4 °F (36.9 °C)   Temp src Oral   SpO2 100 %   O2 Device None (Room air)       Current Vitals:   Vital Signs  BP: 115/75  Pulse: 74  Resp: 20  Temp: 98.4 °F (36.9 °C)  Temp src: Oral    Oxygen Therapy  SpO2: 100 %  O2 Device: None (Room air)            Physical Exam  Vitals and nursing note reviewed.   Constitutional:       Appearance: Normal appearance. She is normal weight.   HENT:      Head: Normocephalic.      Right Ear: External ear normal.      Left Ear: External ear normal.   Eyes:      Extraocular Movements: Extraocular movements intact.      Conjunctiva/sclera: Conjunctivae normal.      Pupils: Pupils are equal, round, and reactive to light.   Pulmonary:      Effort: Pulmonary effort is normal.   Musculoskeletal:         General: Tenderness present.      Cervical back: Normal range of motion and neck supple.      Comments: Pain elicited with extension of lumbar spine.  Tenderness to palpation over the right lumbar paraspinal muscle region.   Skin:     Capillary Refill: Capillary refill takes less than 2 seconds.   Neurological:      General: No focal deficit present.      Mental Status: She is alert.   Psychiatric:         Mood and Affect: Mood normal.               ED Course  Labs Reviewed   POCT PREGNANCY URINE - Normal   EM[facility] POCT URINALYSIS DIPSTICK                            MDM       Georgette Olguin is a 39 year old female with ulcerative colitis and fibromyalgia who presents with acute lower back pain after a workout injury.    She experienced acute lower back pain following a workout on Saturday. During a barbell squat, she felt a 'pop' and a  sharp, lightning bolt-like pain, which she associates with a possible form error. She had increased her weight by five pounds during this session. The pain is primarily located in the lower back and is exacerbated by movement, particularly when extending her back.    She has attempted self-care measures including icing the area multiple times a day and performing gentle stretches, such as pelvic tilts and knee-to-chest exercises. However, these measures have not significantly alleviated her pain. The pain is severe enough to impact her daily activities, initially improving with rest but returning upon resuming normal activities.    Her past medical history includes ulcerative colitis and fibromyalgia. She has previously used medications such as prednisone for UC flare-ups and tramadol for pain management related to both UC and fibromyalgia. She reports sensitivity to the side effects of steroids, including mood swings and hot flashes, and prefers to avoid them if possible. She has tolerated NSAIDs like Toradol in the past, despite her general avoidance of this medication class due to her UC.    In terms of her current medication use, she has tried Tylenol without relief and has been relying on ice for symptom management. She is unable to take NSAIDs at home due to her medical history.    Severe lower back pain with movement. No recent fever or other systemic symptoms.  Vital signs: Please see EMR.  Physical exam: Please see exam.  Differential diagnosis: Lumbar strain, sciatica, lumbar sacral radiculopath, fracture.  Recent Results (from the past 24 hours)   POCT Urinalysis Dipstick    Collection Time: 07/14/25  4:16 PM   Result Value Ref Range    Urine Color Yellow Yellow    Urine Clarity Clear Clear    Specific Gravity, Urine 1.015 1.005 - 1.030    PH, Urine 6.0 5.0 - 8.0    Protein urine Negative Negative mg/dL    Glucose, Urine Negative Negative mg/dL    Ketone, Urine Negative Negative mg/dL    Bilirubin, Urine  Negative Negative    Blood, Urine Negative Negative    Nitrite Urine Negative Negative    Urobilinogen urine <2.0 <2.0 mg/dL    Leukocyte esterase urine Negative Negative   POCT Pregnancy, Urine    Collection Time: 07/14/25  4:17 PM   Result Value Ref Range    POCT Urine Pregnancy Negative Negative     I have personally reviewed the x-ray of the lumbar spine and there is decreased vertebral height between L4 and L5.  There is also lumbar straightening appreciated.  Awaiting formal radiology read.  XR LUMBAR SPINE (MIN 2 VIEWS) (CPT=72100)  Result Date: 7/14/2025  CONCLUSION: No acute fracture or subluxation in the lumbar spine. Degenerative changes as above. Electronically Verified and Signed by Attending Radiologist: Bandar Krause MD 7/14/2025 4:35 PM Workstation: [id number]    Based on physical exam and HPI will diagnose lumbar strain.  Patient states that she does not tolerate anti-inflammatories such as naproxen or NSAIDs well.  Will prescribe lidocaine patches and muscle relaxer to patient.  Patient is to follow-up with primary care provider as needed.  ED precautions given.    Medical Decision Making  Georgette Olguin is a 39 year old female with ulcerative colitis and fibromyalgia who presents with acute lower back pain after a workout injury.    She experienced acute lower back pain following a workout on Saturday. During a barbell squat, she felt a 'pop' and a sharp, lightning bolt-like pain, which she associates with a possible form error. She had increased her weight by five pounds during this session. The pain is primarily located in the lower back and is exacerbated by movement, particularly when extending her back.    She has attempted self-care measures including icing the area multiple times a day and performing gentle stretches, such as pelvic tilts and knee-to-chest exercises. However, these measures have not significantly alleviated her pain. The pain is severe enough to impact her daily  activities, initially improving with rest but returning upon resuming normal activities.    Her past medical history includes ulcerative colitis and fibromyalgia. She has previously used medications such as prednisone for UC flare-ups and tramadol for pain management related to both UC and fibromyalgia. She reports sensitivity to the side effects of steroids, including mood swings and hot flashes, and prefers to avoid them if possible. She has tolerated NSAIDs like Toradol in the past, despite her general avoidance of this medication class due to her UC.    In terms of her current medication use, she has tried Tylenol without relief and has been relying on ice for symptom management. She is unable to take NSAIDs at home due to her medical history.    Severe lower back pain with movement. No recent fever or other systemic symptoms.      Problems Addressed:  Strain of lumbar region, initial encounter: acute illness or injury    Amount and/or Complexity of Data Reviewed  Independent Historian: spouse  Labs: ordered. Decision-making details documented in ED Course.     Details: Recent Results (from the past 24 hours)  -POCT Urinalysis Dipstick:   Collection Time: 07/14/25  4:16 PM       Result                      Value             Ref Range           Urine Color                 Yellow            Yellow              Urine Clarity               Clear             Clear               Specific Gravity, Urine     1.015             1.005 - 1.030       PH, Urine                   6.0               5.0 - 8.0           Protein urine               Negative          Negative mg/*       Glucose, Urine              Negative          Negative mg/*       Ketone, Urine               Negative          Negative mg/*       Bilirubin, Urine            Negative          Negative            Blood, Urine                Negative          Negative            Nitrite Urine               Negative          Negative            Urobilinogen urine           <2.0              <2.0 mg/dL          Leukocyte esterase uri*     Negative          Negative       -POCT Pregnancy, Urine:   Collection Time: 07/14/25  4:17 PM       Result                      Value             Ref Range           POCT Urine Pregnancy        Negative          Negative         Radiology: ordered and independent interpretation performed. Decision-making details documented in ED Course.     Details: XR LUMBAR SPINE (MIN 2 VIEWS) (CPT=72100)  Result Date: 7/14/2025  CONCLUSION: No acute fracture or subluxation in the lumbar spine. Degenerative changes as above. Electronically Verified and Signed by Attending Radiologist: Bandar Krause MD 7/14/2025 4:35 PM Workstation: [id number]      ECG/medicine tests: ordered and independent interpretation performed. Decision-making details documented in ED Course.    Risk  Prescription drug management.        Disposition and Plan     Clinical Impression:  1. Strain of lumbar region, initial encounter         Disposition:  Discharge  7/14/2025  4:52 pm    Follow-up:  Viktoriya Quinteros MD  [id number] [id number]  [phone]      If symptoms worsen          Medications Prescribed:  Current Discharge Medication List        START taking these medications    Details   lidocaine 5 % External Patch Place 1 patch onto the skin daily for 5 days.  Qty: 5 patch, Refills: 0      cyclobenzaprine 10 MG Oral Tab Take 1 tablet (10 mg total) by mouth 3 (three) times daily as needed for Muscle spasms.  Qty: 20 tablet, Refills: 0                   Supplementary Documentation:

## 2025-07-14 NOTE — DISCHARGE INSTRUCTIONS
VISIT SUMMARY:  Today, you were seen for acute lower back pain that started after a workout injury. You felt a 'pop' and sharp pain during a barbell squat, which has been affecting your daily activities. We discussed your history of ulcerative colitis and fibromyalgia, and your sensitivity to certain medications.    YOUR PLAN:  -LUMBAR STRAIN WITH MUSCLE TEAR: You have an acute lumbar strain with a suspected muscle tear, likely from improper squat form. This means that the muscles in your lower back are injured, causing pain and difficulty with movement. We will order an x-ray to check your spine, give you a Toradol injection for pain relief, and prescribe a muscle relaxer. Avoid weight training for 6-8 weeks, continue icing the area, and consider using a massage gun after one week if needed. Physical therapy is recommended for rehabilitation.    INSTRUCTIONS:  The x-ray of your lumbar spine shows some degenerative changes specifically in the L4-L5 region.  Please take the prescribed muscle relaxer and lidocaine patch.  If your symptoms do not improve please follow-up with your primary care provider for further evaluation and/or possible referral to physical therapy and/or spine management.  Do not drive while taking muscle relaxer as may cause drowsiness.  Please refrain from weight lifting for approximately 4 to 6 weeks.    Contains text generated by Ben

## 2025-07-29 ENCOUNTER — PATIENT MESSAGE (OUTPATIENT)
Dept: FAMILY MEDICINE CLINIC | Facility: CLINIC | Age: 40
End: 2025-07-29

## 2025-07-29 DIAGNOSIS — M79.7 FIBROMYALGIA: ICD-10-CM

## 2025-08-01 RX ORDER — TRAMADOL HYDROCHLORIDE 50 MG/1
50 TABLET ORAL EVERY 8 HOURS PRN
Refills: 0
Start: 2025-08-01

## 2025-08-03 DIAGNOSIS — M79.7 FIBROMYALGIA: ICD-10-CM

## 2025-08-04 ENCOUNTER — HOSPITAL ENCOUNTER (OUTPATIENT)
Age: 40
Discharge: HOME | End: 2025-08-04

## 2025-08-04 ENCOUNTER — APPOINTMENT (OUTPATIENT)
Dept: CT IMAGING | Age: 40
End: 2025-08-04
Attending: PHYSICIAN ASSISTANT

## 2025-08-04 VITALS
BODY MASS INDEX: 23 KG/M2 | SYSTOLIC BLOOD PRESSURE: 113 MMHG | WEIGHT: 127 LBS | HEART RATE: 86 BPM | RESPIRATION RATE: 18 BRPM | OXYGEN SATURATION: 100 % | DIASTOLIC BLOOD PRESSURE: 81 MMHG | TEMPERATURE: 99 F

## 2025-08-04 DIAGNOSIS — G44.309 POST-CONCUSSION HEADACHE: Primary | ICD-10-CM

## 2025-08-04 LAB — B-HCG UR QL: NEGATIVE

## 2025-08-04 PROCEDURE — 70450 CT HEAD/BRAIN W/O DYE: CPT | Performed by: PHYSICIAN ASSISTANT

## 2025-08-04 PROCEDURE — 99214 OFFICE O/P EST MOD 30 MIN: CPT

## 2025-08-04 PROCEDURE — 81025 URINE PREGNANCY TEST: CPT

## 2025-08-04 PROCEDURE — 96375 TX/PRO/DX INJ NEW DRUG ADDON: CPT

## 2025-08-04 PROCEDURE — 96361 HYDRATE IV INFUSION ADD-ON: CPT

## 2025-08-04 PROCEDURE — 96374 THER/PROPH/DIAG INJ IV PUSH: CPT

## 2025-08-04 RX ORDER — ONDANSETRON 4 MG/1
4 TABLET, ORALLY DISINTEGRATING ORAL EVERY 6 HOURS PRN
Qty: 15 TABLET | Refills: 0 | Status: SHIPPED | OUTPATIENT
Start: 2025-08-04

## 2025-08-04 RX ORDER — METOCLOPRAMIDE HYDROCHLORIDE 5 MG/ML
10 INJECTION INTRAMUSCULAR; INTRAVENOUS ONCE
Status: COMPLETED | OUTPATIENT
Start: 2025-08-04 | End: 2025-08-04

## 2025-08-04 RX ORDER — DIPHENHYDRAMINE HYDROCHLORIDE 50 MG/ML
25 INJECTION, SOLUTION INTRAMUSCULAR; INTRAVENOUS ONCE
Status: COMPLETED | OUTPATIENT
Start: 2025-08-04 | End: 2025-08-04

## 2025-08-04 RX ORDER — IBUPROFEN 600 MG/1
600 TABLET, FILM COATED ORAL EVERY 8 HOURS PRN
Qty: 30 TABLET | Refills: 0 | Status: SHIPPED | OUTPATIENT
Start: 2025-08-04 | End: 2025-08-04 | Stop reason: CLARIF

## 2025-08-04 RX ORDER — KETOROLAC TROMETHAMINE 30 MG/ML
15 INJECTION, SOLUTION INTRAMUSCULAR; INTRAVENOUS ONCE
Status: COMPLETED | OUTPATIENT
Start: 2025-08-04 | End: 2025-08-04

## 2025-08-04 RX ORDER — GABAPENTIN 600 MG/1
600 TABLET ORAL 3 TIMES DAILY
Qty: 270 TABLET | Refills: 0 | Status: SHIPPED | OUTPATIENT
Start: 2025-08-04

## 2025-08-04 RX ORDER — SODIUM CHLORIDE 9 MG/ML
1000 INJECTION, SOLUTION INTRAVENOUS ONCE
Status: COMPLETED | OUTPATIENT
Start: 2025-08-04 | End: 2025-08-04

## 2025-08-04 RX ORDER — TRAZODONE HYDROCHLORIDE 50 MG/1
50 TABLET ORAL NIGHTLY
COMMUNITY